# Patient Record
Sex: MALE | Race: WHITE | NOT HISPANIC OR LATINO | URBAN - METROPOLITAN AREA
[De-identification: names, ages, dates, MRNs, and addresses within clinical notes are randomized per-mention and may not be internally consistent; named-entity substitution may affect disease eponyms.]

---

## 2020-01-06 ENCOUNTER — INPATIENT (INPATIENT)
Facility: HOSPITAL | Age: 75
LOS: 4 days | Discharge: HOME | DRG: 871 | End: 2020-01-11
Attending: HOSPITALIST | Admitting: HOSPITALIST
Payer: MEDICARE

## 2020-01-06 VITALS
SYSTOLIC BLOOD PRESSURE: 120 MMHG | RESPIRATION RATE: 20 BRPM | DIASTOLIC BLOOD PRESSURE: 71 MMHG | TEMPERATURE: 102 F | HEIGHT: 66 IN | HEART RATE: 124 BPM | WEIGHT: 229.94 LBS | OXYGEN SATURATION: 95 %

## 2020-01-06 DIAGNOSIS — L03.90 CELLULITIS, UNSPECIFIED: ICD-10-CM

## 2020-01-06 DIAGNOSIS — E11.610 TYPE 2 DIABETES MELLITUS WITH DIABETIC NEUROPATHIC ARTHROPATHY: Chronic | ICD-10-CM

## 2020-01-06 LAB
ALBUMIN SERPL ELPH-MCNC: 3.8 G/DL — SIGNIFICANT CHANGE UP (ref 3.3–5.2)
ALP SERPL-CCNC: 67 U/L — SIGNIFICANT CHANGE UP (ref 40–120)
ALT FLD-CCNC: 14 U/L — SIGNIFICANT CHANGE UP
ANION GAP SERPL CALC-SCNC: 16 MMOL/L — SIGNIFICANT CHANGE UP (ref 5–17)
APPEARANCE UR: CLEAR — SIGNIFICANT CHANGE UP
APTT BLD: 29.7 SEC — SIGNIFICANT CHANGE UP (ref 27.5–36.3)
AST SERPL-CCNC: 25 U/L — SIGNIFICANT CHANGE UP
BASOPHILS # BLD AUTO: 0 K/UL — SIGNIFICANT CHANGE UP (ref 0–0.2)
BASOPHILS NFR BLD AUTO: 0 % — SIGNIFICANT CHANGE UP (ref 0–2)
BILIRUB SERPL-MCNC: 1.1 MG/DL — SIGNIFICANT CHANGE UP (ref 0.4–2)
BILIRUB UR-MCNC: NEGATIVE — SIGNIFICANT CHANGE UP
BUN SERPL-MCNC: 20 MG/DL — SIGNIFICANT CHANGE UP (ref 8–20)
CALCIUM SERPL-MCNC: 9.1 MG/DL — SIGNIFICANT CHANGE UP (ref 8.6–10.2)
CHLORIDE SERPL-SCNC: 94 MMOL/L — LOW (ref 98–107)
CO2 SERPL-SCNC: 21 MMOL/L — LOW (ref 22–29)
COLOR SPEC: YELLOW — SIGNIFICANT CHANGE UP
CREAT SERPL-MCNC: 0.81 MG/DL — SIGNIFICANT CHANGE UP (ref 0.5–1.3)
DIFF PNL FLD: NEGATIVE — SIGNIFICANT CHANGE UP
EOSINOPHIL # BLD AUTO: 0.12 K/UL — SIGNIFICANT CHANGE UP (ref 0–0.5)
EOSINOPHIL NFR BLD AUTO: 0.9 % — SIGNIFICANT CHANGE UP (ref 0–6)
EPI CELLS # UR: SIGNIFICANT CHANGE UP
GIANT PLATELETS BLD QL SMEAR: PRESENT — SIGNIFICANT CHANGE UP
GLUCOSE BLDC GLUCOMTR-MCNC: 264 MG/DL — HIGH (ref 70–99)
GLUCOSE BLDC GLUCOMTR-MCNC: 273 MG/DL — HIGH (ref 70–99)
GLUCOSE BLDC GLUCOMTR-MCNC: 276 MG/DL — HIGH (ref 70–99)
GLUCOSE SERPL-MCNC: 227 MG/DL — HIGH (ref 70–115)
GLUCOSE UR QL: 1000 MG/DL
HCT VFR BLD CALC: 43 % — SIGNIFICANT CHANGE UP (ref 39–50)
HGB BLD-MCNC: 13.9 G/DL — SIGNIFICANT CHANGE UP (ref 13–17)
INR BLD: 1.29 RATIO — HIGH (ref 0.88–1.16)
KETONES UR-MCNC: ABNORMAL
LACTATE BLDV-MCNC: 1.7 MMOL/L — SIGNIFICANT CHANGE UP (ref 0.5–2)
LACTATE BLDV-MCNC: 2.9 MMOL/L — HIGH (ref 0.5–2)
LEUKOCYTE ESTERASE UR-ACNC: NEGATIVE — SIGNIFICANT CHANGE UP
LYMPHOCYTES # BLD AUTO: 0.23 K/UL — LOW (ref 1–3.3)
LYMPHOCYTES # BLD AUTO: 1.7 % — LOW (ref 13–44)
MANUAL SMEAR VERIFICATION: SIGNIFICANT CHANGE UP
MCHC RBC-ENTMCNC: 29.6 PG — SIGNIFICANT CHANGE UP (ref 27–34)
MCHC RBC-ENTMCNC: 32.3 GM/DL — SIGNIFICANT CHANGE UP (ref 32–36)
MCV RBC AUTO: 91.7 FL — SIGNIFICANT CHANGE UP (ref 80–100)
MONOCYTES # BLD AUTO: 1.05 K/UL — HIGH (ref 0–0.9)
MONOCYTES NFR BLD AUTO: 7.8 % — SIGNIFICANT CHANGE UP (ref 2–14)
NEUTROPHILS # BLD AUTO: 12.1 K/UL — HIGH (ref 1.8–7.4)
NEUTROPHILS NFR BLD AUTO: 88.7 % — HIGH (ref 43–77)
NEUTS BAND # BLD: 0.9 % — SIGNIFICANT CHANGE UP (ref 0–8)
NITRITE UR-MCNC: NEGATIVE — SIGNIFICANT CHANGE UP
PH UR: 6 — SIGNIFICANT CHANGE UP (ref 5–8)
PLAT MORPH BLD: ABNORMAL
PLATELET # BLD AUTO: 323 K/UL — SIGNIFICANT CHANGE UP (ref 150–400)
POIKILOCYTOSIS BLD QL AUTO: SLIGHT — SIGNIFICANT CHANGE UP
POTASSIUM SERPL-MCNC: 5.1 MMOL/L — SIGNIFICANT CHANGE UP (ref 3.5–5.3)
POTASSIUM SERPL-SCNC: 5.1 MMOL/L — SIGNIFICANT CHANGE UP (ref 3.5–5.3)
PROT SERPL-MCNC: 7.8 G/DL — SIGNIFICANT CHANGE UP (ref 6.6–8.7)
PROT UR-MCNC: 15 MG/DL
PROTHROM AB SERPL-ACNC: 14.9 SEC — HIGH (ref 10–12.9)
RAPID RVP RESULT: SIGNIFICANT CHANGE UP
RBC # BLD: 4.69 M/UL — SIGNIFICANT CHANGE UP (ref 4.2–5.8)
RBC # FLD: 14.8 % — HIGH (ref 10.3–14.5)
RBC BLD AUTO: ABNORMAL
SODIUM SERPL-SCNC: 131 MMOL/L — LOW (ref 135–145)
SP GR SPEC: 1.01 — SIGNIFICANT CHANGE UP (ref 1.01–1.02)
UROBILINOGEN FLD QL: NEGATIVE MG/DL — SIGNIFICANT CHANGE UP
WBC # BLD: 13.51 K/UL — HIGH (ref 3.8–10.5)
WBC # FLD AUTO: 13.51 K/UL — HIGH (ref 3.8–10.5)
WBC UR QL: SIGNIFICANT CHANGE UP

## 2020-01-06 PROCEDURE — 93010 ELECTROCARDIOGRAM REPORT: CPT

## 2020-01-06 PROCEDURE — 99291 CRITICAL CARE FIRST HOUR: CPT

## 2020-01-06 PROCEDURE — 73630 X-RAY EXAM OF FOOT: CPT | Mod: 26,LT

## 2020-01-06 PROCEDURE — 71045 X-RAY EXAM CHEST 1 VIEW: CPT | Mod: 26

## 2020-01-06 RX ORDER — DAPAGLIFLOZIN 10 MG/1
1 TABLET, FILM COATED ORAL
Qty: 0 | Refills: 0 | DISCHARGE

## 2020-01-06 RX ORDER — SODIUM CHLORIDE 9 MG/ML
1000 INJECTION INTRAMUSCULAR; INTRAVENOUS; SUBCUTANEOUS
Refills: 0 | Status: DISCONTINUED | OUTPATIENT
Start: 2020-01-06 | End: 2020-01-07

## 2020-01-06 RX ORDER — INSULIN GLARGINE 100 [IU]/ML
50 INJECTION, SOLUTION SUBCUTANEOUS
Qty: 0 | Refills: 0 | DISCHARGE

## 2020-01-06 RX ORDER — DEXTROSE 50 % IN WATER 50 %
25 SYRINGE (ML) INTRAVENOUS ONCE
Refills: 0 | Status: DISCONTINUED | OUTPATIENT
Start: 2020-01-06 | End: 2020-01-11

## 2020-01-06 RX ORDER — INSULIN GLARGINE 100 [IU]/ML
40 INJECTION, SOLUTION SUBCUTANEOUS AT BEDTIME
Refills: 0 | Status: DISCONTINUED | OUTPATIENT
Start: 2020-01-06 | End: 2020-01-11

## 2020-01-06 RX ORDER — NOREPINEPHRINE BITARTRATE/D5W 8 MG/250ML
0.05 PLASTIC BAG, INJECTION (ML) INTRAVENOUS
Qty: 8 | Refills: 0 | Status: DISCONTINUED | OUTPATIENT
Start: 2020-01-06 | End: 2020-01-07

## 2020-01-06 RX ORDER — ENOXAPARIN SODIUM 100 MG/ML
40 INJECTION SUBCUTANEOUS EVERY 12 HOURS
Refills: 0 | Status: DISCONTINUED | OUTPATIENT
Start: 2020-01-06 | End: 2020-01-11

## 2020-01-06 RX ORDER — GLUCAGON INJECTION, SOLUTION 0.5 MG/.1ML
1 INJECTION, SOLUTION SUBCUTANEOUS ONCE
Refills: 0 | Status: DISCONTINUED | OUTPATIENT
Start: 2020-01-06 | End: 2020-01-11

## 2020-01-06 RX ORDER — VANCOMYCIN HCL 1 G
1000 VIAL (EA) INTRAVENOUS ONCE
Refills: 0 | Status: COMPLETED | OUTPATIENT
Start: 2020-01-06 | End: 2020-01-06

## 2020-01-06 RX ORDER — INSULIN GLARGINE 100 [IU]/ML
6 INJECTION, SOLUTION SUBCUTANEOUS AT BEDTIME
Refills: 0 | Status: DISCONTINUED | OUTPATIENT
Start: 2020-01-06 | End: 2020-01-06

## 2020-01-06 RX ORDER — PIPERACILLIN AND TAZOBACTAM 4; .5 G/20ML; G/20ML
3.38 INJECTION, POWDER, LYOPHILIZED, FOR SOLUTION INTRAVENOUS EVERY 8 HOURS
Refills: 0 | Status: DISCONTINUED | OUTPATIENT
Start: 2020-01-06 | End: 2020-01-10

## 2020-01-06 RX ORDER — VANCOMYCIN HCL 1 G
1000 VIAL (EA) INTRAVENOUS EVERY 12 HOURS
Refills: 0 | Status: DISCONTINUED | OUTPATIENT
Start: 2020-01-06 | End: 2020-01-07

## 2020-01-06 RX ORDER — MORPHINE SULFATE 50 MG/1
2 CAPSULE, EXTENDED RELEASE ORAL EVERY 4 HOURS
Refills: 0 | Status: DISCONTINUED | OUTPATIENT
Start: 2020-01-06 | End: 2020-01-11

## 2020-01-06 RX ORDER — INSULIN ASPART 100 [IU]/ML
15 INJECTION, SOLUTION SUBCUTANEOUS
Qty: 0 | Refills: 0 | DISCHARGE

## 2020-01-06 RX ORDER — VENLAFAXINE HCL 75 MG
0 CAPSULE, EXT RELEASE 24 HR ORAL
Qty: 0 | Refills: 0 | DISCHARGE

## 2020-01-06 RX ORDER — MIDODRINE HYDROCHLORIDE 2.5 MG/1
10 TABLET ORAL ONCE
Refills: 0 | Status: COMPLETED | OUTPATIENT
Start: 2020-01-06 | End: 2020-01-06

## 2020-01-06 RX ORDER — MORPHINE SULFATE 50 MG/1
2 CAPSULE, EXTENDED RELEASE ORAL
Refills: 0 | Status: DISCONTINUED | OUTPATIENT
Start: 2020-01-06 | End: 2020-01-11

## 2020-01-06 RX ORDER — PIPERACILLIN AND TAZOBACTAM 4; .5 G/20ML; G/20ML
3.38 INJECTION, POWDER, LYOPHILIZED, FOR SOLUTION INTRAVENOUS ONCE
Refills: 0 | Status: COMPLETED | OUTPATIENT
Start: 2020-01-06 | End: 2020-01-06

## 2020-01-06 RX ORDER — INSULIN GLARGINE 100 [IU]/ML
15 INJECTION, SOLUTION SUBCUTANEOUS
Qty: 0 | Refills: 0 | DISCHARGE

## 2020-01-06 RX ORDER — SODIUM CHLORIDE 9 MG/ML
1000 INJECTION, SOLUTION INTRAVENOUS
Refills: 0 | Status: DISCONTINUED | OUTPATIENT
Start: 2020-01-06 | End: 2020-01-11

## 2020-01-06 RX ORDER — METFORMIN HYDROCHLORIDE 850 MG/1
1 TABLET ORAL
Qty: 0 | Refills: 0 | DISCHARGE

## 2020-01-06 RX ORDER — VENLAFAXINE HCL 75 MG
1 CAPSULE, EXT RELEASE 24 HR ORAL
Qty: 0 | Refills: 0 | DISCHARGE

## 2020-01-06 RX ORDER — IBUPROFEN 200 MG
600 TABLET ORAL ONCE
Refills: 0 | Status: COMPLETED | OUTPATIENT
Start: 2020-01-06 | End: 2020-01-06

## 2020-01-06 RX ORDER — DEXTROSE 50 % IN WATER 50 %
15 SYRINGE (ML) INTRAVENOUS ONCE
Refills: 0 | Status: DISCONTINUED | OUTPATIENT
Start: 2020-01-06 | End: 2020-01-11

## 2020-01-06 RX ORDER — MIDODRINE HYDROCHLORIDE 2.5 MG/1
10 TABLET ORAL EVERY 8 HOURS
Refills: 0 | Status: DISCONTINUED | OUTPATIENT
Start: 2020-01-06 | End: 2020-01-07

## 2020-01-06 RX ORDER — METOPROLOL TARTRATE 50 MG
1 TABLET ORAL
Qty: 0 | Refills: 0 | DISCHARGE

## 2020-01-06 RX ORDER — INSULIN LISPRO 100/ML
VIAL (ML) SUBCUTANEOUS AT BEDTIME
Refills: 0 | Status: DISCONTINUED | OUTPATIENT
Start: 2020-01-06 | End: 2020-01-06

## 2020-01-06 RX ORDER — FUROSEMIDE 40 MG
1 TABLET ORAL
Qty: 0 | Refills: 0 | DISCHARGE

## 2020-01-06 RX ORDER — DAPAGLIFLOZIN 10 MG/1
0 TABLET, FILM COATED ORAL
Qty: 0 | Refills: 0 | DISCHARGE

## 2020-01-06 RX ORDER — ACETAMINOPHEN 500 MG
650 TABLET ORAL ONCE
Refills: 0 | Status: COMPLETED | OUTPATIENT
Start: 2020-01-06 | End: 2020-01-06

## 2020-01-06 RX ORDER — DAPAGLIFLOZIN 10 MG/1
10 TABLET, FILM COATED ORAL
Qty: 0 | Refills: 0 | DISCHARGE

## 2020-01-06 RX ORDER — ACETAMINOPHEN 500 MG
650 TABLET ORAL EVERY 4 HOURS
Refills: 0 | Status: DISCONTINUED | OUTPATIENT
Start: 2020-01-06 | End: 2020-01-07

## 2020-01-06 RX ORDER — SODIUM CHLORIDE 9 MG/ML
3200 INJECTION, SOLUTION INTRAVENOUS ONCE
Refills: 0 | Status: DISCONTINUED | OUTPATIENT
Start: 2020-01-06 | End: 2020-01-06

## 2020-01-06 RX ORDER — SODIUM CHLORIDE 9 MG/ML
2000 INJECTION INTRAMUSCULAR; INTRAVENOUS; SUBCUTANEOUS ONCE
Refills: 0 | Status: COMPLETED | OUTPATIENT
Start: 2020-01-06 | End: 2020-01-06

## 2020-01-06 RX ORDER — INSULIN HUMAN 100 [IU]/ML
4 INJECTION, SOLUTION SUBCUTANEOUS
Qty: 50 | Refills: 0 | Status: DISCONTINUED | OUTPATIENT
Start: 2020-01-06 | End: 2020-01-06

## 2020-01-06 RX ORDER — DEXTROSE 50 % IN WATER 50 %
12.5 SYRINGE (ML) INTRAVENOUS ONCE
Refills: 0 | Status: DISCONTINUED | OUTPATIENT
Start: 2020-01-06 | End: 2020-01-11

## 2020-01-06 RX ORDER — INSULIN HUMAN 100 [IU]/ML
10 INJECTION, SOLUTION SUBCUTANEOUS ONCE
Refills: 0 | Status: COMPLETED | OUTPATIENT
Start: 2020-01-06 | End: 2020-01-06

## 2020-01-06 RX ORDER — INSULIN LISPRO 100/ML
VIAL (ML) SUBCUTANEOUS
Refills: 0 | Status: DISCONTINUED | OUTPATIENT
Start: 2020-01-06 | End: 2020-01-06

## 2020-01-06 RX ORDER — LIRAGLUTIDE 6 MG/ML
0 INJECTION SUBCUTANEOUS
Qty: 0 | Refills: 0 | DISCHARGE

## 2020-01-06 RX ADMIN — Medication 9.78 MICROGRAM(S)/KG/MIN: at 18:50

## 2020-01-06 RX ADMIN — SODIUM CHLORIDE 1000 MILLILITER(S): 9 INJECTION INTRAMUSCULAR; INTRAVENOUS; SUBCUTANEOUS at 12:06

## 2020-01-06 RX ADMIN — SODIUM CHLORIDE 150 MILLILITER(S): 9 INJECTION INTRAMUSCULAR; INTRAVENOUS; SUBCUTANEOUS at 15:01

## 2020-01-06 RX ADMIN — MIDODRINE HYDROCHLORIDE 10 MILLIGRAM(S): 2.5 TABLET ORAL at 18:49

## 2020-01-06 RX ADMIN — PIPERACILLIN AND TAZOBACTAM 200 GRAM(S): 4; .5 INJECTION, POWDER, LYOPHILIZED, FOR SOLUTION INTRAVENOUS at 12:05

## 2020-01-06 RX ADMIN — Medication 650 MILLIGRAM(S): at 13:42

## 2020-01-06 RX ADMIN — Medication 600 MILLIGRAM(S): at 15:37

## 2020-01-06 RX ADMIN — ENOXAPARIN SODIUM 40 MILLIGRAM(S): 100 INJECTION SUBCUTANEOUS at 21:11

## 2020-01-06 RX ADMIN — Medication 2: at 21:16

## 2020-01-06 RX ADMIN — INSULIN GLARGINE 6 UNIT(S): 100 INJECTION, SOLUTION SUBCUTANEOUS at 21:16

## 2020-01-06 RX ADMIN — INSULIN GLARGINE 40 UNIT(S): 100 INJECTION, SOLUTION SUBCUTANEOUS at 23:49

## 2020-01-06 RX ADMIN — PIPERACILLIN AND TAZOBACTAM 25 GRAM(S): 4; .5 INJECTION, POWDER, LYOPHILIZED, FOR SOLUTION INTRAVENOUS at 22:26

## 2020-01-06 RX ADMIN — SODIUM CHLORIDE 150 MILLILITER(S): 9 INJECTION INTRAMUSCULAR; INTRAVENOUS; SUBCUTANEOUS at 23:36

## 2020-01-06 RX ADMIN — Medication 650 MILLIGRAM(S): at 12:05

## 2020-01-06 RX ADMIN — Medication 250 MILLIGRAM(S): at 14:48

## 2020-01-06 RX ADMIN — INSULIN HUMAN 10 UNIT(S): 100 INJECTION, SOLUTION SUBCUTANEOUS at 23:49

## 2020-01-06 RX ADMIN — Medication 250 MILLIGRAM(S): at 21:12

## 2020-01-06 NOTE — ED ADULT NURSE NOTE - NSIMPLEMENTINTERV_GEN_ALL_ED
Implemented All Fall Risk Interventions:  [place] to call system. Call bell, personal items and telephone within reach. Instruct patient to call for assistance. Room bathroom lighting operational. Non-slip footwear when patient is off stretcher. Physically safe environment: no spills, clutter or unnecessary equipment. Stretcher in lowest position, wheels locked, appropriate side rails in place. Provide visual cue, wrist band, yellow gown, etc. Monitor gait and stability. Monitor for mental status changes and reorient to person, place, and time. Review medications for side effects contributing to fall risk. Reinforce activity limits and safety measures with patient and family.

## 2020-01-06 NOTE — H&P ADULT - ASSESSMENT
1: Severe sepsis   2: Left LE SSTI with DM ulcer need to r/o OM and Bacteremia   3: FRENCH vs CKD and lactic acidosis   4: Hyperglycemia with underlying DM    Patient seen and examined   Full code now  POC explained to patient     Neuro:   Pain Mx: none for now, Gabapentin if neuropathy pain  Sedation/Anxiolytic: none  Fall precautions     Cardiovascular:   MAP Target: 65  Improved BP with IVF boluses with good mentation, peripheral perfusion with good urine output; would hold antihypertensive, maintenance IVF with trending Lactic acid; TTE; midodrine 5-10 TID     Resp/Chest:   On supplemental oxygen-> oxygenating and ventilating fine for now     Gi/Nutrition:   Diet: Diabetic diet   Bowel Regimen as needed    ID:   Microbiological studies: Blood Cx, ESR, CRP, MRI left foot with IV contrast; ID eval  Abx: Would recommend Zosyn and Vanco for now and target to organism if and when possible    Nephro/Electrolyte/Acid-Base:   Avoid nephrotoxic agents  Strict I&O with Cr monitoring   Close Electrolyte monitoring and correction as needed     Endocrinology:   ISS with coverage + PRN hypoglycemia Protocol   I would recommend long acting insulin as well as patient takes high dose Trujeo and Victoza with metformin at home       Haem/Oncology:   Mechanical and Chemical DVT ppx

## 2020-01-06 NOTE — ED PROVIDER NOTE - ATTENDING CONTRIBUTION TO CARE
seen with resident: elderly male with [facility] medical problems with fever, chills, and generalized fatigue for 2 days; also notes left foot ulcer chronic; on exam, fatigued but awake alert and appropriate; III/VI systolic murmur (knows about this); clear lungs b/l; abd soft nt nd; RLE no abnormalities other than chronic stasis dermatitis; LLE with plantar aspect of foot with large ulceration with erythema and warmth extending to medial aspect of foot;   re-evaluated after fluids. I have re-evaluated the patient's fluid status and reviewed the vital sign after the fluid bolus.  HR improved, maintaining MAP with IVF alone; seen by MICU; ok for admit to stepdown, d/w hospitalists  Podiatry consulted as well

## 2020-01-06 NOTE — H&P ADULT - NSHPSOCIALHISTORY_GEN_ALL_CORE
40 pack year tobacco abuse stopped about 40 years ago   No current alcohol tobacco or substance abuse

## 2020-01-06 NOTE — CONSULT NOTE ADULT - SUBJECTIVE AND OBJECTIVE BOX
Patient is a 74y old  Male who presents with a chief complaint of     HPI/ BRIEF HOSPITAL COURSE:   75 y/o Male who presented with c/o Lethargy feeling generalized weakness fever and chills that started today and unaware of draining left foot ulcer with erythema had fever, elevated WBC, tachycardia, hypotension needed IVF-> MICU called for eval.   At baseline, patient's BP runs in 110's and takes enalapril 10 at home.   Has been "under the weather for couple of days and denied any other complaints       PAST MEDICAL & SURGICAL HISTORY:  HLD (hyperlipidemia)  DM (diabetes mellitus)  Charcot foot due to diabetes mellitus s/p foot sx on left foot with arch defect   Hx of OM in foot  Hx of Right foot ulcer     All systems reviewed with symptoms mentions as above.     Physical Examination:    ICU Vital Signs Last 24 Hrs  T(C): 39.2 (06 Jan 2020 15:00), Max: 39.2 (06 Jan 2020 15:00)  T(F): 102.5 (06 Jan 2020 15:00), Max: 102.5 (06 Jan 2020 15:00)  HR: 92 (06 Jan 2020 15:49) (92 - 124)  BP: 95/52 (06 Jan 2020 15:49) (77/48 - 120/71)  BP(mean): --  ABP: --  ABP(mean): --  RR: 21 (06 Jan 2020 15:49) (20 - 21)  SpO2: 98% (06 Jan 2020 15:49) (95% - 98%)      General: No acute distress, Obese male in bed, diaphoretic   Neuro: AAO*3, No motor, sensory, or cranial nerve deficit (Acute)  HEENT: Pupils equal, reactive to light, Oral mucosa dry   PULM: Clear to auscultation bilaterally, no significant adventitious breath sounds   CVS: Regular rhythm and controlled to increased rate, + murmurs no rubs, or gallops  ABD: Soft, nondistended, nontender, normoactive bowel sounds, no CVA tenderness  EXT: No b/l LE edema, nontender with pedal pulse palpable (weak1+), b/l Charcot feet with draining ulcer with erythema     SKIN: Warm and well perfused, no acute rashes       Medications:    norepinephrine Infusion 0.05 MICROgram(s)/kG/Min IV Continuous <Continuous>  ibuprofen  Tablet. 600 milliGRAM(s) Oral Once  sodium chloride 0.9%. 1000 milliLiter(s) IV Continuous <Continuous>      I&O's Detail        RADIOLOGY/ Microbiology/ Labs: reviewed     CRITICAL CARE TIME SPENT: 40 min

## 2020-01-06 NOTE — H&P ADULT - HISTORY OF PRESENT ILLNESS
73 y/o Male who presented with c/o Lethargy feeling generalized weakness fever and chills that started today and unaware of draining left foot ulcer with erythema had fever, elevated WBC, tachycardia, hypotension needed IVF-> MICU called for eval.   At baseline, patient's BP runs in 110's and takes enalapril 10 at home.   Has been "under the weather for couple of days and denied any other complaints   Since ICU saw the patient, had worsening of BP and needed to be started in IV Levophed and hence admitted to MICU.

## 2020-01-06 NOTE — ED ADULT NURSE REASSESSMENT NOTE - NS ED NURSE REASSESS COMMENT FT1
pt now hypotensive, awake and alert, in no apparent distress at this time. even and unlabored resps observed, 3rd L of NS opened wide for bolus, ER MD Brock at bedside and aware. remains on 2L 02 via nasal cannula, sinus tach on monitor, vanco infusing at this time on IV pump as ordered. XRs complete.

## 2020-01-06 NOTE — CONSULT NOTE ADULT - SUBJECTIVE AND OBJECTIVE BOX
74 yr old male w DM, Charcot foot w chronic ulcer, HLD,  unspecified valvular disease,  presented,to ED w shaking and suspected high sugar    Reports recent cold and cough; taking cold ez  Traveled from [place] to attend a .  Today in [place] developed sudden chills and lethargy  Has had weekly wound care at wound center    In ED T 102.2   /71  H124  95% RA  Treated w 31 cc/kg 3200 cc RL then added 2000 cc for BP  NS  Cultured then zosyn. vanco  WBC 13K  Critical care consulted for tenuous BP    PCP Dr. Mckeon [phone]   CARD  Dr. Scales [phone]       PAST MEDICAL HX  Charcot foot  DM diabetes since   Foot ulcer  OM osteomyelitis foot    PAST SURGICAL HX  Charcot joint 'shaving'  toe amputation          T(C): 37.4 (20 @ 16:30), Max: 39.2 (20 @ 15:00)  HR: 79 (20 @ 18:45) (79 - 124)  BP: 81/45 (20 @ 18:45) (70/49 - 120/71)  RR: 21 (20 @ 17:15) (20 - 21)  SpO2: 97% (20 @ 17:15) (95% - 98%)    PHYSICAL EXAM: evaluation precludes physical exam. Pertinent physical exam findings as per video conference with  teleNA at bedside is as follow:      pt appears ill, diaphoretic  c/o back pain, lying L lateral decub      CARDIAC MARKERS ( 2020 12:10 )  x     / <0.01 ng/mL / x     / x     / x                        13.9   13.51 )-----------( 323      ( 2020 12:10 )             43.0     2020 12:10    131    |  94     |  20.0   ----------------------------<  227    5.1     |  21.0   |  0.81     Ca    9.1        2020 12:10    TPro  7.8    /  Alb  3.8    /  TBili  1.1    /  DBili  x      /  AST  25     /  ALT  14     /  AlkPhos  67     2020 12:10    PT/INR - ( 2020 12:10 )   PT: 14.9 sec;   INR: 1.29 ratio         PTT - ( 2020 12:10 )  PTT:29.7 sec  CAPILLARY BLOOD GLUCOSE      POCT Blood Glucose.: 231 mg/dL (2020 11:34)    LIVER FUNCTIONS - ( 2020 12:10 )  Alb: 3.8 g/dL / Pro: 7.8 g/dL / ALK PHOS: 67 U/L / ALT: 14 U/L / AST: 25 U/L / GGT: x           Urinalysis Basic - ( 2020 15:31 )    Color: Yellow / Appearance: Clear / S.010 / pH: x  Gluc: x / Ketone: Trace  / Bili: Negative / Urobili: Negative mg/dL   Blood: x / Protein: 15 mg/dL / Nitrite: Negative   Leuk Esterase: Negative / RBC: x / WBC 0-2   Sq Epi: x / Non Sq Epi: Occasional / Bacteria: x      RADIOLOGY  EXAM:  XR CHEST PORTABLE IMMED 1V                          PROCEDURE DATE:  2020          INTERPRETATION:  EXAM:XR CHEST IMMEDIATE.     CLINICAL INDICATION: Sepsis .     TECHNIQUE: AP view.     PRIOR EXAM: None.     FINDINGS:      Limited evaluation of left lung base. Rest of the visualized lung fields however are clear. Cardiac and mediastinal silhouette is magnified. Visualized osseous structures are unremarkable.      IMPRESSION:     Limited evaluation of left lung base, otherwise, no evidence of any acute lung disease.        EXAM:  FOOT-LEFT                          PROCEDURE DATE:  2020          INTERPRETATION:  EXAM:XR FOOT LEFT.     CLINICAL INDICATION: diabetic foot ulcer, fever, eval osteomyelitis .     TECHNIQUE: 3 views.     PRIOR EXAM: None.     FINDINGS:      Prior resection of the great toe at the level of mid first metatarsal. 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 Bony fusion across the proximal first through fifth metatarsals and tarsals. Reversal of the plantar arch.    Diffuse soft tissue swelling. Plantar foot ulcer. No radiographic evidence of adjacent osteomyelitis. Consider correlation with MR imaging.      IMPRESSION:     Plantar foot ulcer. No radiographic evidence of osteomyelitis.    Additional findings as above. 74 yr old male w DM, Charcot foot w chronic ulcer, HLD,  unspecified valvular disease,  presented,to ED w shaking and suspected high sugar    Reports recent cold and cough; taking cold ez  Traveled from [place] to attend a .  Today in [place] developed sudden chills and lethargy  Has had weekly wound care at wound center    In ED T 102.2   /71  H124  95% RA  Treated w 31 cc/kg 3200 cc RL then added 2000 cc for BP  NS  Cultured then zosyn. vanco  WBC 13K  Critical care consulted for tenuous BP    PCP Dr. Mckeon [phone]   CARD  Dr. Scales [phone]       PAST MEDICAL HX  Charcot foot  DM diabetes since   Foot ulcer  OM osteomyelitis foot    PAST SURGICAL HX  Charcot joint 'shaving'  toe amputation    FAMILY HX  +DM:father  +heart conditions: mother and 3 siblings (2 brothers, 1 sister)      SOCIAL HX  visiting from [place]  former smoker      NKDA          T(C): 37.4 (20 @ 16:30), Max: 39.2 (20 @ 15:00)  HR: 79 (20 @ 18:45) (79 - 124)  BP: 81/45 (20 @ 18:45) (70/49 - 120/71)  RR: 21 (20 @ 17:15) (20 - 21)  SpO2: 97% (20 @ 17:15) (95% - 98%)    PHYSICAL EXAM: evaluation precludes physical exam. Pertinent physical exam findings as per video conference with  teleNA at bedside is as follow:      pt appears ill, diaphoretic  c/o back pain, lying L lateral decub      CARDIAC MARKERS ( 2020 12:10 )  x     / <0.01 ng/mL / x     / x     / x                        13.9   13.51 )-----------( 323      ( 2020 12:10 )             43.0     2020 12:10    131    |  94     |  20.0   ----------------------------<  227    5.1     |  21.0   |  0.81     Ca    9.1        2020 12:10    TPro  7.8    /  Alb  3.8    /  TBili  1.1    /  DBili  x      /  AST  25     /  ALT  14     /  AlkPhos  67     2020 12:10    PT/INR - ( 2020 12:10 )   PT: 14.9 sec;   INR: 1.29 ratio         PTT - ( 2020 12:10 )  PTT:29.7 sec  CAPILLARY BLOOD GLUCOSE      POCT Blood Glucose.: 231 mg/dL (2020 11:34)    LIVER FUNCTIONS - ( 2020 12:10 )  Alb: 3.8 g/dL / Pro: 7.8 g/dL / ALK PHOS: 67 U/L / ALT: 14 U/L / AST: 25 U/L / GGT: x           Urinalysis Basic - ( 2020 15:31 )    Color: Yellow / Appearance: Clear / S.010 / pH: x  Gluc: x / Ketone: Trace  / Bili: Negative / Urobili: Negative mg/dL   Blood: x / Protein: 15 mg/dL / Nitrite: Negative   Leuk Esterase: Negative / RBC: x / WBC 0-2   Sq Epi: x / Non Sq Epi: Occasional / Bacteria: x      RADIOLOGY  EXAM:  XR CHEST PORTABLE IMMED 1V                          PROCEDURE DATE:  2020          INTERPRETATION:  EXAM:XR CHEST IMMEDIATE.     CLINICAL INDICATION: Sepsis .     TECHNIQUE: AP view.     PRIOR EXAM: None.     FINDINGS:      Limited evaluation of left lung base. Rest of the visualized lung fields however are clear. Cardiac and mediastinal silhouette is magnified. Visualized osseous structures are unremarkable.      IMPRESSION:     Limited evaluation of left lung base, otherwise, no evidence of any acute lung disease.        EXAM:  FOOT-LEFT                          PROCEDURE DATE:  2020          INTERPRETATION:  EXAM:XR FOOT LEFT.     CLINICAL INDICATION: diabetic foot ulcer, fever, eval osteomyelitis .     TECHNIQUE: 3 views.     PRIOR EXAM: None.     FINDINGS:      Prior resection of the great toe at the level of mid first metatarsal. 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 Bony fusion across the proximal first through fifth metatarsals and tarsals. Reversal of the plantar arch.    Diffuse soft tissue swelling. Plantar foot ulcer. No radiographic evidence of adjacent osteomyelitis. Consider correlation with MR imaging.      IMPRESSION:     Plantar foot ulcer. No radiographic evidence of osteomyelitis.    Additional findings as above.

## 2020-01-06 NOTE — CONSULT NOTE ADULT - ATTENDING COMMENTS
VTE enoxaparin 40 units q 12 hrs  med rec did some w pt and then contacted ED pharmacy to assist  TTE

## 2020-01-06 NOTE — ED ADULT NURSE REASSESSMENT NOTE - NS ED NURSE REASSESS COMMENT FT1
assumed care of patient at 1545. resting comfortably in stretcher in Peds 2 with family at bedside. Patient informed and aware of plan of care. A&Ox4 at this time offering no complaints. BP improving with IV fluids. will continue to monitor for need of IV pressors

## 2020-01-06 NOTE — CONSULT NOTE ADULT - ASSESSMENT
#Sepsis  likely secondary to Charcot foot ulcer:  cellulitis w lymphangitis, poss OM foot  elevated CRP  1. admit to stepdown  2. critical care note read  3. zosyn  4. vanco  5. will need MRI        #Tenuous BP  AM holding   1. enalapril  2. betablocker  3. midodrine as per CCM  4. ? pressors  5. I/O  6. daily weight      #Valvular heart disease  needs some valve replaced  1. TTE      #DM  1. diet ordered  2. hold trujeo  3. hold farxiga  4. hold metformin  5. NPH 6 units q HS  6. BGM  7. ISS        IMPROVE VTE Individual Risk Assessment    RISK                                                                Points    [  ] Previous VTE                                                  3    [  ] Thrombophilia                                               2    [  ] Lower limb paralysis                                      2        (unable to hold up >15 seconds)      [  ] Current Cancer                                              2         (within 6 months)    [X  ] Immobilization > 24 hrs                                1    [  ] ICU/CCU stay > 24 hours                              1    [ X ] Age > 60                                                      1    IMPROVE VTE Score ________2_    IMPROVE Score 0-1: Low Risk, No VTE prophylaxis required for most patients, encourage ambulation.   IMPROVE Score 2-3: At risk, pharmacologic VTE prophylaxis is indicated for most patients (in the absence of a contraindication)  IMPROVE Score > or = 4: High Risk, pharmacologic VTE prophylaxis is indicated for most patients (in the absence of a contraindication)        ADDENDUM  Pt was hypotensive when admitting hospitalist saw pt 70 sys x 2 repeat  admission refused; CCM to be reconsulted #Sepsis  likely secondary to Charcot foot ulcer:  cellulitis w lymphangitis, poss OM foot  elevated CRP  1. admit to stepdown  2. critical care note read  3. zosyn  4. vanco  5. will need MRI        #Tenuous BP   holding   1. enalapril  2. betablocker  3. midodrine as per CCM  4. ? pressors  5. I/O  6. s/p 31 cc/kg 3200 + 2000 cc on 150 cc.hr  6. daily weight      #Valvular heart disease  needs some valve replaced  1. TTE      #DM  1. diet ordered  2. hold trujeo  3. hold farxiga  4. hold metformin  5. NPH 6 units q HS  6. BGM  7. ISS        IMPROVE VTE Individual Risk Assessment    RISK                                                                Points    [  ] Previous VTE                                                  3    [  ] Thrombophilia                                               2    [  ] Lower limb paralysis                                      2        (unable to hold up >15 seconds)      [  ] Current Cancer                                              2         (within 6 months)    [X  ] Immobilization > 24 hrs                                1    [  ] ICU/CCU stay > 24 hours                              1    [ X ] Age > 60                                                      1    IMPROVE VTE Score ________2_    IMPROVE Score 0-1: Low Risk, No VTE prophylaxis required for most patients, encourage ambulation.   IMPROVE Score 2-3: At risk, pharmacologic VTE prophylaxis is indicated for most patients (in the absence of a contraindication)  IMPROVE Score > or = 4: High Risk, pharmacologic VTE prophylaxis is indicated for most patients (in the absence of a contraindication)        ADDENDUM  Pt was hypotensive when admitting hospitalist saw pt 70 sys x 2 repeat  admission refused; CCM to be reconsulted

## 2020-01-06 NOTE — ED PROVIDER NOTE - NS ED ROS FT
Constitutional: +fever, no chills  Eyes: no vision changes  ENT: +nasal congestion, no sore throat  CV: no chest pain  Resp: +cough, +shortness of breath  GI: no abdominal pain, no vomiting, no diarrhea  : no dysuria  MSK: no joint pain  Skin: no rash  Neuro: no headache, +generalized weakness, no paresthesias

## 2020-01-06 NOTE — ED PROVIDER NOTE - OBJECTIVE STATEMENT
74y M w/ hx DM, HLD, unspecified valvular disease, Charcot foot, presenting for fever and lethargy.  Per family, pt has had 2 days of cough/congestion.  They traveled to [place] from [place] yesterday for a , and this morning was at the  when he began complaining of feeling very weak.  Family notes that pt seems more lethargic than usual.  Found to be febrile on arrival in the ED.  Pt is normally ambulatory, is not on home O2.  Denies chest pain, abdominal pain, n/v/d, urinary complaints.  Of note, pt has a diabetic foot ulcer on the L; family has been changing his dressing daily.

## 2020-01-06 NOTE — H&P ADULT - NSHPPHYSICALEXAM_GEN_ALL_CORE
General: No acute distress, Obese male in bed, diaphoretic   Neuro: AAO*3, No motor, sensory, or cranial nerve deficit (Acute)  HEENT: Pupils equal, reactive to light, Oral mucosa dry   PULM: Clear to auscultation bilaterally, no significant adventitious breath sounds   CVS: Regular rhythm and controlled to increased rate, + murmurs no rubs, or gallops  ABD: Soft, nondistended, nontender, normoactive bowel sounds, no CVA tenderness  EXT: No b/l LE edema, nontender with pedal pulse palpable (weak1+), b/l Charcot feet with draining ulcer with erythema   SKIN: Warm and well perfused, no acute rashes

## 2020-01-06 NOTE — ED PROVIDER NOTE - PROGRESS NOTE DETAILS
EKG EKG reviewed - no old available for comparison.  Pt denies any chest pain.  Has no known hx CAD.  Pt's presentation more consistent with infectious etiology.  Will check troponin. Brock: brought to bedside, patient now hypotensive to 70s systolic; despite 4 liters of fluid in at this point, will start peripheral levophed; MICU reconsulted; will d/c admission to stepdown, d/w hospitalist admit to MICU

## 2020-01-06 NOTE — ED ADULT TRIAGE NOTE - CHIEF COMPLAINT QUOTE
"I think my sugar is very high" wife states pt has a diabetic ulcer on the bottom of left foot and she thinks that's what it is from.  Pt appear lethargic is responsive, agitated with wife. Breathing even & unlabored.  Sees wound care for foot.

## 2020-01-06 NOTE — ED ADULT NURSE NOTE - OBJECTIVE STATEMENT
Pt c/o hyperglycemia, fever, lethargy. Has diabetic wound that is clean and dry to bottom of left foot. Pt c/o hyperglycemia, fever, lethargy. Has diabetic wound that is clean and dry to bottom of left foot. pt reports cough congestion and fatigue as well x few days. pt with diabetic wound to left foot, plantar aspect. even and unlabored resps lethargic on arrival yet AOX3. family at bedside, no nausea/vomiting. streaking noted to lower left extremity.

## 2020-01-06 NOTE — ED PROVIDER NOTE - PHYSICAL EXAMINATION
Constitutional: Awake, alert, in no acute distress, appears lethargic but answering questions appropriately  Eyes: no scleral icterus  HENT: normocephalic, atraumatic, moist oral mucosa  CV: +tachycardic, regular rhythm, no murmur  Pulm: +tachypneic, diminished breath sounds b/l  Abdomen: soft, non-tender, non-distended  Extremities: +ulcer over plantar aspect of mid L foot, appears clean without significant drainage; +warmth and erythema over medial foot.  Skin: no jaundice  Neuro: AAOx3, moving all extremities equally

## 2020-01-06 NOTE — ED PROVIDER NOTE - CLINICAL SUMMARY MEDICAL DECISION MAKING FREE TEXT BOX
74y M w/ DM, HLD, presenting for fever, lethargy and weakness.  Pt febrile, tachycardic, tachypneic, satting 89% on RA.  Physical exam notable for L foot diabetic ulcer with some adjacent erythema noted.  Code sepsis initiated with IV fluid bolus (calculated based on ideal body weight), vancomycin and zosyn, tylenol.  CXR, labs, cultures, RVP, X-ray foot.

## 2020-01-06 NOTE — ED ADULT NURSE REASSESSMENT NOTE - NS ED NURSE REASSESS COMMENT FT1
despite 4 Liters of IV fluids patient remains hypotensive, lowest BP of systolic 70s. patient remains A&Ox4 with no complaints. to start norepinephrine at this time with ICU to accept. patient eating dinner without problem

## 2020-01-07 LAB
-  CANDIDA ALBICANS: SIGNIFICANT CHANGE UP
-  CANDIDA GLABRATA: SIGNIFICANT CHANGE UP
-  CANDIDA KRUSEI: SIGNIFICANT CHANGE UP
-  CANDIDA PARAPSILOSIS: SIGNIFICANT CHANGE UP
-  CANDIDA TROPICALIS: SIGNIFICANT CHANGE UP
-  COAGULASE NEGATIVE STAPHYLOCOCCUS: SIGNIFICANT CHANGE UP
-  K. PNEUMONIAE GROUP: SIGNIFICANT CHANGE UP
-  KPC RESISTANCE GENE: SIGNIFICANT CHANGE UP
-  STREPTOCOCCUS SP. (NOT GRP A, B OR S PNEUMONIAE): SIGNIFICANT CHANGE UP
A BAUMANNII DNA SPEC QL NAA+PROBE: SIGNIFICANT CHANGE UP
ANION GAP SERPL CALC-SCNC: 14 MMOL/L — SIGNIFICANT CHANGE UP (ref 5–17)
BASOPHILS # BLD AUTO: 0 K/UL — SIGNIFICANT CHANGE UP (ref 0–0.2)
BASOPHILS NFR BLD AUTO: 0 % — SIGNIFICANT CHANGE UP (ref 0–2)
BUN SERPL-MCNC: 23 MG/DL — HIGH (ref 8–20)
CALCIUM SERPL-MCNC: 7.9 MG/DL — LOW (ref 8.6–10.2)
CHLORIDE SERPL-SCNC: 106 MMOL/L — SIGNIFICANT CHANGE UP (ref 98–107)
CHOLEST SERPL-MCNC: 66 MG/DL — LOW (ref 110–199)
CO2 SERPL-SCNC: 21 MMOL/L — LOW (ref 22–29)
CREAT SERPL-MCNC: 0.98 MG/DL — SIGNIFICANT CHANGE UP (ref 0.5–1.3)
CULTURE RESULTS: NO GROWTH — SIGNIFICANT CHANGE UP
E CLOAC COMP DNA BLD POS QL NAA+PROBE: SIGNIFICANT CHANGE UP
E COLI DNA BLD POS QL NAA+NON-PROBE: SIGNIFICANT CHANGE UP
ENTEROCOC DNA BLD POS QL NAA+NON-PROBE: SIGNIFICANT CHANGE UP
ENTEROCOC DNA BLD POS QL NAA+NON-PROBE: SIGNIFICANT CHANGE UP
EOSINOPHIL # BLD AUTO: 0 K/UL — SIGNIFICANT CHANGE UP (ref 0–0.5)
EOSINOPHIL NFR BLD AUTO: 0 % — SIGNIFICANT CHANGE UP (ref 0–6)
ERYTHROCYTE [SEDIMENTATION RATE] IN BLOOD: 46 MM/HR — HIGH (ref 0–20)
GLUCOSE BLDC GLUCOMTR-MCNC: 124 MG/DL — HIGH (ref 70–99)
GLUCOSE BLDC GLUCOMTR-MCNC: 146 MG/DL — HIGH (ref 70–99)
GLUCOSE BLDC GLUCOMTR-MCNC: 157 MG/DL — HIGH (ref 70–99)
GLUCOSE BLDC GLUCOMTR-MCNC: 170 MG/DL — HIGH (ref 70–99)
GLUCOSE BLDC GLUCOMTR-MCNC: 180 MG/DL — HIGH (ref 70–99)
GLUCOSE BLDC GLUCOMTR-MCNC: 214 MG/DL — HIGH (ref 70–99)
GLUCOSE BLDC GLUCOMTR-MCNC: 89 MG/DL — SIGNIFICANT CHANGE UP (ref 70–99)
GLUCOSE BLDC GLUCOMTR-MCNC: 99 MG/DL — SIGNIFICANT CHANGE UP (ref 70–99)
GLUCOSE SERPL-MCNC: 146 MG/DL — HIGH (ref 70–115)
GP B STREP DNA BLD POS QL NAA+NON-PROBE: SIGNIFICANT CHANGE UP
HAEM INFLU DNA BLD POS QL NAA+NON-PROBE: SIGNIFICANT CHANGE UP
HBA1C BLD-MCNC: 7.7 % — HIGH (ref 4–5.6)
HCT VFR BLD CALC: 38.3 % — LOW (ref 39–50)
HDLC SERPL-MCNC: 25 MG/DL — LOW
HGB BLD-MCNC: 12.1 G/DL — LOW (ref 13–17)
HYPOCHROMIA BLD QL: SLIGHT — SIGNIFICANT CHANGE UP
K OXYTOCA DNA BLD POS QL NAA+NON-PROBE: SIGNIFICANT CHANGE UP
L MONOCYTOG DNA BLD POS QL NAA+NON-PROBE: SIGNIFICANT CHANGE UP
LACTATE SERPL-SCNC: 1.3 MMOL/L — SIGNIFICANT CHANGE UP (ref 0.5–2)
LIPID PNL WITH DIRECT LDL SERPL: 22 MG/DL — SIGNIFICANT CHANGE UP
LYMPHOCYTES # BLD AUTO: 0 % — LOW (ref 13–44)
LYMPHOCYTES # BLD AUTO: 0 K/UL — LOW (ref 1–3.3)
MAGNESIUM SERPL-MCNC: 1.7 MG/DL — SIGNIFICANT CHANGE UP (ref 1.6–2.6)
MANUAL SMEAR VERIFICATION: SIGNIFICANT CHANGE UP
MCHC RBC-ENTMCNC: 28.7 PG — SIGNIFICANT CHANGE UP (ref 27–34)
MCHC RBC-ENTMCNC: 31.6 GM/DL — LOW (ref 32–36)
MCV RBC AUTO: 91 FL — SIGNIFICANT CHANGE UP (ref 80–100)
METHOD TYPE: SIGNIFICANT CHANGE UP
MICROCYTES BLD QL: SLIGHT — SIGNIFICANT CHANGE UP
MONOCYTES # BLD AUTO: 0.35 K/UL — SIGNIFICANT CHANGE UP (ref 0–0.9)
MONOCYTES NFR BLD AUTO: 3.5 % — SIGNIFICANT CHANGE UP (ref 2–14)
MRSA SPEC QL CULT: SIGNIFICANT CHANGE UP
MSSA DNA SPEC QL NAA+PROBE: SIGNIFICANT CHANGE UP
N MEN ISLT CULT: SIGNIFICANT CHANGE UP
NEUTROPHILS # BLD AUTO: 9.52 K/UL — HIGH (ref 1.8–7.4)
NEUTROPHILS NFR BLD AUTO: 95.6 % — HIGH (ref 43–77)
NEUTS BAND # BLD: 0.9 % — SIGNIFICANT CHANGE UP (ref 0–8)
OVALOCYTES BLD QL SMEAR: SLIGHT — SIGNIFICANT CHANGE UP
P AERUGINOSA DNA BLD POS NAA+NON-PROBE: SIGNIFICANT CHANGE UP
PHOSPHATE SERPL-MCNC: 2.6 MG/DL — SIGNIFICANT CHANGE UP (ref 2.4–4.7)
PLAT MORPH BLD: NORMAL — SIGNIFICANT CHANGE UP
PLATELET # BLD AUTO: 283 K/UL — SIGNIFICANT CHANGE UP (ref 150–400)
PLATELET COUNT - ESTIMATE: NORMAL — SIGNIFICANT CHANGE UP
POTASSIUM SERPL-MCNC: 3.6 MMOL/L — SIGNIFICANT CHANGE UP (ref 3.5–5.3)
POTASSIUM SERPL-SCNC: 3.6 MMOL/L — SIGNIFICANT CHANGE UP (ref 3.5–5.3)
RBC # BLD: 4.21 M/UL — SIGNIFICANT CHANGE UP (ref 4.2–5.8)
RBC # FLD: 14.6 % — HIGH (ref 10.3–14.5)
RBC BLD AUTO: ABNORMAL
S MARCESCENS DNA BLD POS NAA+NON-PROBE: SIGNIFICANT CHANGE UP
S PNEUM DNA BLD POS QL NAA+NON-PROBE: SIGNIFICANT CHANGE UP
S PYO DNA BLD POS QL NAA+NON-PROBE: SIGNIFICANT CHANGE UP
SODIUM SERPL-SCNC: 141 MMOL/L — SIGNIFICANT CHANGE UP (ref 135–145)
SPECIMEN SOURCE: SIGNIFICANT CHANGE UP
TOTAL CHOLESTEROL/HDL RATIO MEASUREMENT: 3 RATIO — LOW (ref 3.4–9.6)
TRIGL SERPL-MCNC: 97 MG/DL — SIGNIFICANT CHANGE UP (ref 10–200)
WBC # BLD: 9.87 K/UL — SIGNIFICANT CHANGE UP (ref 3.8–10.5)
WBC # FLD AUTO: 9.87 K/UL — SIGNIFICANT CHANGE UP (ref 3.8–10.5)

## 2020-01-07 PROCEDURE — 93306 TTE W/DOPPLER COMPLETE: CPT | Mod: 26

## 2020-01-07 PROCEDURE — 99291 CRITICAL CARE FIRST HOUR: CPT

## 2020-01-07 PROCEDURE — 99222 1ST HOSP IP/OBS MODERATE 55: CPT

## 2020-01-07 PROCEDURE — 99233 SBSQ HOSP IP/OBS HIGH 50: CPT

## 2020-01-07 RX ORDER — MIDODRINE HYDROCHLORIDE 2.5 MG/1
5 TABLET ORAL EVERY 8 HOURS
Refills: 0 | Status: DISCONTINUED | OUTPATIENT
Start: 2020-01-07 | End: 2020-01-10

## 2020-01-07 RX ORDER — SACCHAROMYCES BOULARDII 250 MG
250 POWDER IN PACKET (EA) ORAL
Refills: 0 | Status: DISCONTINUED | OUTPATIENT
Start: 2020-01-07 | End: 2020-01-11

## 2020-01-07 RX ORDER — LEVALBUTEROL 1.25 MG/.5ML
0.63 SOLUTION, CONCENTRATE RESPIRATORY (INHALATION) EVERY 6 HOURS
Refills: 0 | Status: DISCONTINUED | OUTPATIENT
Start: 2020-01-07 | End: 2020-01-10

## 2020-01-07 RX ORDER — ATORVASTATIN CALCIUM 80 MG/1
20 TABLET, FILM COATED ORAL AT BEDTIME
Refills: 0 | Status: DISCONTINUED | OUTPATIENT
Start: 2020-01-07 | End: 2020-01-11

## 2020-01-07 RX ORDER — POTASSIUM CHLORIDE 20 MEQ
40 PACKET (EA) ORAL ONCE
Refills: 0 | Status: COMPLETED | OUTPATIENT
Start: 2020-01-07 | End: 2020-01-07

## 2020-01-07 RX ORDER — ACETAMINOPHEN 500 MG
650 TABLET ORAL EVERY 6 HOURS
Refills: 0 | Status: DISCONTINUED | OUTPATIENT
Start: 2020-01-07 | End: 2020-01-11

## 2020-01-07 RX ORDER — INSULIN GLARGINE 100 [IU]/ML
15 INJECTION, SOLUTION SUBCUTANEOUS ONCE
Refills: 0 | Status: COMPLETED | OUTPATIENT
Start: 2020-01-07 | End: 2020-01-07

## 2020-01-07 RX ORDER — VENLAFAXINE HCL 75 MG
75 CAPSULE, EXT RELEASE 24 HR ORAL DAILY
Refills: 0 | Status: DISCONTINUED | OUTPATIENT
Start: 2020-01-07 | End: 2020-01-11

## 2020-01-07 RX ORDER — MAGNESIUM SULFATE 500 MG/ML
1 VIAL (ML) INJECTION ONCE
Refills: 0 | Status: COMPLETED | OUTPATIENT
Start: 2020-01-07 | End: 2020-01-07

## 2020-01-07 RX ORDER — MAGNESIUM SULFATE 500 MG/ML
2 VIAL (ML) INJECTION ONCE
Refills: 0 | Status: COMPLETED | OUTPATIENT
Start: 2020-01-07 | End: 2020-01-07

## 2020-01-07 RX ORDER — INSULIN LISPRO 100/ML
VIAL (ML) SUBCUTANEOUS
Refills: 0 | Status: DISCONTINUED | OUTPATIENT
Start: 2020-01-07 | End: 2020-01-11

## 2020-01-07 RX ADMIN — Medication 650 MILLIGRAM(S): at 01:00

## 2020-01-07 RX ADMIN — Medication 650 MILLIGRAM(S): at 21:30

## 2020-01-07 RX ADMIN — Medication 650 MILLIGRAM(S): at 22:30

## 2020-01-07 RX ADMIN — PIPERACILLIN AND TAZOBACTAM 25 GRAM(S): 4; .5 INJECTION, POWDER, LYOPHILIZED, FOR SOLUTION INTRAVENOUS at 14:04

## 2020-01-07 RX ADMIN — ENOXAPARIN SODIUM 40 MILLIGRAM(S): 100 INJECTION SUBCUTANEOUS at 21:21

## 2020-01-07 RX ADMIN — Medication 250 MILLIGRAM(S): at 05:02

## 2020-01-07 RX ADMIN — MIDODRINE HYDROCHLORIDE 5 MILLIGRAM(S): 2.5 TABLET ORAL at 21:21

## 2020-01-07 RX ADMIN — MIDODRINE HYDROCHLORIDE 5 MILLIGRAM(S): 2.5 TABLET ORAL at 14:03

## 2020-01-07 RX ADMIN — Medication 650 MILLIGRAM(S): at 02:00

## 2020-01-07 RX ADMIN — INSULIN GLARGINE 15 UNIT(S): 100 INJECTION, SOLUTION SUBCUTANEOUS at 23:00

## 2020-01-07 RX ADMIN — Medication 40 MILLIEQUIVALENT(S): at 06:35

## 2020-01-07 RX ADMIN — ENOXAPARIN SODIUM 40 MILLIGRAM(S): 100 INJECTION SUBCUTANEOUS at 11:29

## 2020-01-07 RX ADMIN — PIPERACILLIN AND TAZOBACTAM 25 GRAM(S): 4; .5 INJECTION, POWDER, LYOPHILIZED, FOR SOLUTION INTRAVENOUS at 21:21

## 2020-01-07 RX ADMIN — Medication 75 MILLIGRAM(S): at 11:29

## 2020-01-07 RX ADMIN — Medication 100 GRAM(S): at 21:21

## 2020-01-07 RX ADMIN — PIPERACILLIN AND TAZOBACTAM 25 GRAM(S): 4; .5 INJECTION, POWDER, LYOPHILIZED, FOR SOLUTION INTRAVENOUS at 06:14

## 2020-01-07 RX ADMIN — Medication 250 MILLIGRAM(S): at 17:40

## 2020-01-07 RX ADMIN — Medication 650 MILLIGRAM(S): at 14:00

## 2020-01-07 RX ADMIN — Medication 50 GRAM(S): at 06:46

## 2020-01-07 RX ADMIN — MIDODRINE HYDROCHLORIDE 10 MILLIGRAM(S): 2.5 TABLET ORAL at 05:02

## 2020-01-07 RX ADMIN — ATORVASTATIN CALCIUM 20 MILLIGRAM(S): 80 TABLET, FILM COATED ORAL at 21:21

## 2020-01-07 NOTE — CONSULT NOTE ADULT - ASSESSMENT
73 y/o Male who presented with c/o Lethargy feeling generalized weakness fever and chills that started today and unaware of draining left foot ulcer with erythema had fever, elevated WBC, tachycardia, hypotension needed IVF-> MICU called for eval.   At baseline, patient's BP runs in 110's and takes enalapril 10 at home.   Has been "under the weather for couple of days and denied any other complaints   Since ICU saw the patient, had worsening of BP and needed to be started in IV Levophed and hence admitted to MICU. (2020 20:35)    He was up here for a  before getting sick,.. Prior coming in, he had shivers and chills at hotel.  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  Has a wound in his left foot for 2 years, being cared for by local podiatrist in [place].  Had a prior hx of long term IV ABX for foot infection in the past, going to local clinic for daily IV antibiotics - it was the least expensive route per patient.     Cultures from blood - aerobic x 2 sets with gram negative rods, identification not picked up on PCR.   wound cx sent x 2 from bedside,.   repeat Blood cx ordered 1/7/19 x 2 set by myself.     Denies antibiotics allergies.         Impression:  diabetic foot ulcer  local cellulitis  possible Osteomyelitis  gram negative bacteremia  Left charcot foot   diabetic neuropathy  febrile illness      Plan:  - check ESR  - concern for osteomyelitis of foot given that ulcer present for 2 years.   - check MRI if not otherwise contraindicated  - foot wound cx sent    regarding bacteremia with GNR  - repeat blood cx today x 2 sets  - stop vanco  - continue zosyn      DM2  - appears to be in fair control   Hemoglobin A1C, Whole Blood: 7.7 % (20 @ 06:58)  - continue DM management      - follow up all outstanding cultures  - trend temperature and WBC curve  - repeat cultures from blood and all sources if febrile.

## 2020-01-07 NOTE — CONSULT NOTE ADULT - SUBJECTIVE AND OBJECTIVE BOX
[facility]  INFECTIOUS DISEASES AND INTERNAL MEDICINE at [place]  =======================================================  Hipolito Clark MD  Diplomates American Board of Internal Medicine and Infectious Diseases  Telephone [phone]  Fax            [phone]  =======================================================    [id number]  JOAN OLIVERA   HPI:  73 y/o Male who presented with c/o Lethargy feeling generalized weakness fever and chills that started today and unaware of draining left foot ulcer with erythema had fever, elevated WBC, tachycardia, hypotension needed IVF-> MICU called for eval.   At baseline, patient's BP runs in 110's and takes enalapril 10 at home.   Has been "under the weather for couple of days and denied any other complaints   Since ICU saw the patient, had worsening of BP and needed to be started in IV Levophed and hence admitted to MICU. (2020 20:35)    He was up here for a  before getting sick,.. Prior coming in, he had shivers and chills at hotel.  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  Has a wound in his left foot for 2 years, being cared for by local podiatrist in [place].  Had a prior hx of long term IV ABX for foot infection in the past, going to local clinic for daily IV antibiotics - it was the least expensive route per patient.     Cultures from blood - aerobic x 2 sets with gram negative rods, identification not picked up on PCR.   wound cx sent x 2 from bedside,.   repeat Blood cx ordered 1/7/19 x 2 set by myself.     Denies antibiotics allergies.   He had fevers to 102.5, which has resolved.      Antibiotics course reviewed and shown in medication section    I have personally reviewed the labs and data; pertinent labs and data are listed in this note; please see below.   =======================================================  Past Medical & Surgical Hx:  =====================  PAST MEDICAL & SURGICAL HISTORY:  HLD (hyperlipidemia)  DM (diabetes mellitus)  Charcot foot due to diabetes mellitus    Problem List:  ==========  HEALTH ISSUES - PROBLEM Dx:    Social Hx:  =======  no toxic habits currently    FAMILY HISTORY:  no significant family history of immunosuppressive disorders in mother or father   =======================================================  REVIEW OF SYSTEMS:  CONSTITUTIONAL:  POSITIVE Fevers AND chills  HEENT:  No diplopia or blurred vision.  No earache, sore throat or runny nose.  CARDIOVASCULAR:  No pressure, squeezing, strangling, tightness, heaviness or aching about the chest, neck, axilla or epigastrium.  RESPIRATORY:  No cough, shortness of breath  GASTROINTESTINAL:  No nausea, vomiting or diarrhea.  GENITOURINARY:  No dysuria, frequency or urgency. No Blood in urine  MUSCULOSKELETAL:  no joint aches, no muscle pain  SKIN:  No change in skin, hair or nails.  NEUROLOGIC:  No Headaches, seizures or weakness.  PSYCHIATRIC:  No disorder of thought or mood.  ENDOCRINE:  No heat or cold intolerance  HEMATOLOGICAL:  No easy bruising or bleeding.   =======================================================  Allergies  No Known Allergies     Antibiotics:  piperacillin/tazobactam IVPB.. 3.375 Gram(s) IV Intermittent every 8 hours    Other medications:  atorvastatin 20 milliGRAM(s) Oral at bedtime  dextrose 5%. 1000 milliLiter(s) IV Continuous <Continuous>  dextrose 50% Injectable 12.5 Gram(s) IV Push once  dextrose 50% Injectable 25 Gram(s) IV Push once  dextrose 50% Injectable 25 Gram(s) IV Push once  enoxaparin Injectable 40 milliGRAM(s) SubCutaneous every 12 hours  insulin glargine Injectable (LANTUS) 40 Unit(s) SubCutaneous at bedtime  insulin lispro (HumaLOG) corrective regimen sliding scale   SubCutaneous Before meals and at bedtime  midodrine 5 milliGRAM(s) Oral every 8 hours  venlafaxine XR. 75 milliGRAM(s) Oral daily     piperacillin/tazobactam IVPB.   200 mL/Hr IV Intermittent (20 @ 12:05)   25 mL/Hr IV Intermittent (20 @ 22:26)   25 mL/Hr IV Intermittent (20 @ 06:14)    vancomycin  IVPB   250 mL/Hr IV Intermittent (20 @ 14:48)   250 mL/Hr IV Intermittent (20 @ 21:12)   250 mL/Hr IV Intermittent (20 @ 05:02)      ======================================================  Physical Exam:  ============  T(F): 97.5 (2020 08:15), Max: 102.5 (2020 15:00)  HR: 86 (2020 11:00)  BP: 114/57 (2020 11:00)  RR: 31 (2020 11:00)  SpO2: 92% (2020 11:00) (77% - 100%)  temp max in last 48H T(F): , Max: 102.5 (20 @ 15:00)Height (cm): 167.6 (20 @ 20:10)  Weight (kg): 108 (20 @ 20:10)  BMI (kg/m2): 38.4 (20 @ 20:10)  BSA (m2): 2.15 (20 @ 20:10)    General:  No acute distress.  Eye: Pupils are equal, round and reactive to light, Extraocular movements are intact, Normal conjunctiva.  HENT: Normocephalic, Oral mucosa is moist, No pharyngeal erythema, No sinus tenderness.  Neck: Supple, No lymphadenopathy.  Respiratory: Lungs are clear to auscultation, Respirations are non-labored.  Cardiovascular: Normal rate, Regular rhythm,   Gastrointestinal: Soft, Non-tender, Non-distended, Normal bowel sounds.  Genitourinary: No costovertebral angle tenderness.  Lymphatics: No lymphadenopathy neck,   Musculoskeletal: Normal range of motion, Normal strength.  Integumentary: No rash.  Large LEFT MID FOOT PLANTAR ULCER WITH TAN/ MUCOID DISCHARGE.  NOT FOUL SMELLING, BUT THICK.   SURROUNDING WARMTH AND REDNESS OF ADJACENT SKIN  Neurologic: Alert, Oriented, No focal deficits, Cranial Nerves II-XII are grossly intact.  Psychiatric: Appropriate mood & affect.    =======================================================  Labs:                        12.1   9.87  )-----------( 283      ( 2020 05:27 )             38.3       WBC Count: 9.87 K/uL (20 @ 05:27)  WBC Count: 13.51 K/uL (20 @ 12:10)          141  |  106  |  23.0<H>  ----------------------------<  146<H>  3.6   |  21.0<L>  |  0.98    Ca    7.9<L>      2020 05:27  Phos  2.6       Mg     1.7         TPro  7.8  /  Alb  3.8  /  TBili  1.1  /  DBili  x   /  AST  25  /  ALT  14  /  AlkPhos  67        Culture - Blood (20 @ 12:14) .  TYPE: (C=Critical, N=Notification, A=Abnormal) C  TESTS:  _  BLD  DATE/TIME CALLED: _ 2020 09:59:40  CALLED TO: Frannie SUÁREZ  READ BACK (2 Patient Identifiers)(Y/N): _ Y  READ BACK VALUES (Y/N): _ Y  CALLED BY: Frannie DOVER    Organism Identification: Blood Culture PCR    Method Type: PCR      Culture - Blood (20 @ 12:13)    Specimen Source: .Blood    Culture Results:   Growth in aerobic bottle: Gram Negative Rods  Aerobic Bottle: 17.58 Hours to positivity  Anaerobic Bottle: No growth to date  .  TYPE: (C=Critical, N=Notification, A=Abnormal) C  TESTS:  _ GS BLD  DATE/TIME CALLED: _ 2020 09:58:24  CALLED TO:Frannie SUÁREZ  READ BACK (2 Patient Identifiers)(Y/N): _ Y  READ BACK VALUES (Y/N): _ Y  CALLED BY: Frannie DOVER      Creatinine, Serum: 0.98 mg/dL (20 @ 05:27)  Creatinine, Serum: 0.81 mg/dL (20 @ 12:10)    C-Reactive Protein, Serum: 13.55 mg/dL (20 @ 12:10)

## 2020-01-07 NOTE — PROVIDER CONTACT NOTE (EICU) - ACTION/TREATMENT ORDERED:
As per [facility] electrolyte standard will give 40 MEQs of KCL PO as well as 2 grams of magnesium sulfate IVPB

## 2020-01-07 NOTE — PROGRESS NOTE ADULT - ASSESSMENT
74 yrs old man with h/o DM neuropathy, DM foot ulcer, Charcot foot, VHD,  a/w  BC on Jan 6: GNR  BC Jan 7: pending  Wound cx: pending      Severe sepsis/septic shock: resolved  VS stable  DG to medical floor    Diabetic foot ulcer, suspected OM  c/w IV ABx as per ID. currently on Zosyn  add florestor  f/u all pending cultures  MRI right foot with IC to r/o OM  Echo: no vegetation  wound care consult  podiatry eval    Hypotension:  h/o HTN  hold all anti-HTN  c/w midodrin  resume anti-HTN when feasible    DM: '  ISS, Accucheck  A1c, lipid    h/o Valvular HD:  echo today showed severe AS  patient is asymptomatic    DVT-P: lovenox  Diet: consistent carb 74 yrs old man with h/o DM neuropathy, DM foot ulcer, Charcot foot, VHD,  a/w  BC on Jan 6: GNR  BC Jan 7: pending  Wound cx: pending      Severe sepsis/septic shock: resolved  VS stable  DG to Tele    Diabetic foot ulcer, suspected OM  c/w IV ABx as per ID. currently on Zosyn  add florestor  f/u all pending cultures  MRI right foot with IC to r/o OM  Echo: no vegetation  wound care consult  podiatry eval  follows podiatry in [place]    Hypotension: likely due to sepsis  h/o HTN  hold all anti-HTN  c/w midodrin  resume anti-HTN when feasible    tachycardia: likely due to sepsis  sinus tachy upto 137 on tele M during movement around 119 at rest  encouraged PO hydration  small IVF    acute resp failure with hypoxia: patient is comfortable  recent travel from [place]  none acute on CXR however limited exam of left lung base  no h/o COPD. cxr did not show any emphysematous changes but some prominent bronchial markings.   former smoker 40 pack year  no ssx of PNA  maintaining sat on NC  duoneb  monitor  leg doppler to r/o DVT    DM: '  ISS, Accucheck  A1c, lipid    h/o AS  echo today showed severe AS. nomral EF  patient is asymptomatic  follows cardiology in [place]    DVT-P: lovenox  Diet: consistent carb

## 2020-01-07 NOTE — PROGRESS NOTE ADULT - SUBJECTIVE AND OBJECTIVE BOX
Patient is a 74y old  Male who presents with a chief complaint of Septic shock (2020 12:18)    75 y/o Male who presented with c/o Lethargy feeling generalized weakness fever and chills that started on the day of admission, patient was unaware of draining left foot ulcer with erythema had fever, elevated WBC, tachycardia, hypotension needed IVF. Seen by MICU and suggested not a candidate for ICU. Medical admission was called. Patient was seen by tele-H and was admitted to SDU, Started on vanco and zosyn and midorin. However his conditions continued to be worse. MICU was recalled and patient was admitted to MICU, developed hypotension needing pressor. ID was consulted. Patient tavelled from [place] to [facility] to attend . Prior to coming, he had shivering chills but no high fever. 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  Has a wound in his left foot for 2 years, being cared for by local podiatrist in [place].  Had a prior hx of long term IV ABX for foot infection in the past, going to local clinic for daily IV antibiotics - it was the least expensive route per patient. Cultures from blood - aerobic x 2 sets with gram negative rods, identification not picked up on PCR.   Wound cx sent x 2 from bedside,. Repeat Blood cx ordered 1/7/19 x 2 by ID. Seen by MICU team and suggested stable to DG to floor.    ROS:    CONSTITUTIONAL:  No distress.no fever/chills/fatigue/weight loss  HEENT:  Eyes:  No diplopia or blurred vision.   CARDIOVASCULAR:  No pressure, squeezing, tightness, heaviness or aching about the chest; no palpitations.no leg swelling, no orthopnea or PND  RESPIRATORY:  no SOB. no wheezing.no cough or sputum.  No hemoptysis  GI: no nausea, no vomiting, no diarrhea, no constipation. No hematochezia or melena  EXT:No joint pain or joint swelling or redness  SKIN: no skin break or ulcer. No cellulitis.   CNS: No headaches. No weakness.no numbness. No depression or anxiety. No SI      PAST MEDICAL HX  Charcot foot  DM diabetes since   Foot ulcer  OM osteomyelitis foot    PAST SURGICAL HX  Charcot joint 'shaving'  toe amputation    FAMILY HX  +DM:father  +heart conditions: mother and 3 siblings (2 brothers, 1 sister)      SOCIAL HX  visiting from [place]  former smoker      NKDA    MEDICATIONS  (STANDING):  atorvastatin 20 milliGRAM(s) Oral at bedtime  dextrose 5%. 1000 milliLiter(s) (50 mL/Hr) IV Continuous <Continuous>  dextrose 50% Injectable 12.5 Gram(s) IV Push once  dextrose 50% Injectable 25 Gram(s) IV Push once  dextrose 50% Injectable 25 Gram(s) IV Push once  enoxaparin Injectable 40 milliGRAM(s) SubCutaneous every 12 hours  insulin glargine Injectable (LANTUS) 40 Unit(s) SubCutaneous at bedtime  insulin lispro (HumaLOG) corrective regimen sliding scale   SubCutaneous Before meals and at bedtime  midodrine 5 milliGRAM(s) Oral every 8 hours  piperacillin/tazobactam IVPB.. 3.375 Gram(s) IV Intermittent every 8 hours  venlafaxine XR. 75 milliGRAM(s) Oral daily    MEDICATIONS  (PRN):  acetaminophen   Tablet .. 650 milliGRAM(s) Oral every 4 hours PRN Mild Pain (1 - 3)  dextrose 40% Gel 15 Gram(s) Oral once PRN Blood Glucose LESS THAN 70 milliGRAM(s)/deciliter  glucagon  Injectable 1 milliGRAM(s) IntraMuscular once PRN Glucose LESS THAN 70 milligrams/deciliter  morphine  - Injectable 2 milliGRAM(s) IV Push every 4 hours PRN Moderate Pain (4 - 6)  morphine  - Injectable 2 milliGRAM(s) IV Push every 2 hours PRN Severe Pain (7 - 10)    Vital Signs Last 24 Hrs  T(C): 37.8 (2020 16:08), Max: 37.8 (2020 16:08)  T(F): 100 (2020 16:08), Max: 100 (2020 16:08)  HR: 98 (2020 15:00) (66 - 108)  BP: 130/75 (2020 15:00) (70/49 - 162/74)  BP(mean): 94 (2020 15:00) (67 - 114)  RR: 26 (2020 15:00) (18 - 62)  SpO2: 95% (2020 15:00) (77% - 100%)          CAPILLARY BLOOD GLUCOSE      POCT Blood Glucose.: 124 mg/dL (2020 11:28)  POCT Blood Glucose.: 89 mg/dL (2020 09:23)  POCT Blood Glucose.: 170 mg/dL (2020 05:36)  POCT Blood Glucose.: 180 mg/dL (2020 02:12)  POCT Blood Glucose.: 214 mg/dL (2020 00:56)  POCT Blood Glucose.: 276 mg/dL (2020 23:48)  POCT Blood Glucose.: 264 mg/dL (2020 22:33)  POCT Blood Glucose.: 273 mg/dL (2020 21:13)      I&O's Detail    2020 07:01  -  2020 07:00  --------------------------------------------------------  IN:    norepinephrine Infusion: 54.6 mL    sodium chloride 0.9%: 1350 mL    Solution: 500 mL    Solution: 150 mL    Solution: 50 mL  Total IN: 2104.6 mL    OUT:    Voided: 1625 mL  Total OUT: 1625 mL    Total NET: 479.6 mL      2020 07:01  -  2020 16:34  --------------------------------------------------------  IN:  Total IN: 0 mL    OUT:    Voided: 900 mL  Total OUT: 900 mL    Total NET: -900 mL      GENERAL: Not in distress. Alert    HEENT:  Normocephalic and atraumatic. PEARLA,EOMI  NECK: Supple.  No JVD.    CARDIOVASCULAR: RRR S1, S2. No murmur/rubs/gallop  LUNGS: BLAE+, no rales, no wheezing, no rhonchi.    ABDOMEN: ND. Soft,  NT, no guarding / rebound / rigidity. BS normoactive. No CVA tenderness.    BACK: No spine tenderness.  EXTREMITIES: no cyanosis, no clubbing, no edema.   SKIN: no rash. No skin break or ulcer. No cellulitis.  NEUROLOGIC: AAO*3.strength is symmetric, sensation intact, speech fluent.    PSYCHIATRIC: Calm.  No agitation.    Lab:                           12.1   9.87  )-----------( 283      ( 2020 05:27 )             38.3       01-07    141  |  106  |  23.0<H>  ----------------------------<  146<H>  3.6   |  21.0<L>  |  0.98    Ca    7.9<L>      2020 05:27  Phos  2.6     -  Mg     1.7     -    TPro  7.8  /  Alb  3.8  /  TBili  1.1  /  DBili  x   /  AST  25  /  ALT  14  /  AlkPhos  67  01-06      PT/INR - ( 2020 12:10 )   PT: 14.9 sec;   INR: 1.29 ratio         PTT - ( 2020 12:10 )  PTT:29.7 sec    Urinalysis (20 @ 15:31)    pH Urine: 6.0    Glucose Qualitative, Urine: 1000 mg/dL    Blood, Urine: Negative    Color: Yellow    Urine Appearance: Clear    Bilirubin: Negative    Ketone - Urine: Trace    Specific Gravity: 1.010    Protein, Urine: 15 mg/dL    Urobilinogen: Negative mg/dL    Nitrite: Negative    Leukocyte Esterase Concentration: Negative    Culture - Blood (20 @ 12:14)    -  Multidrug (KPC pos) resistant organism: Nondet    -  Staphylococcus aureus: Nondet    -  Methicillin resistant Staphylococcus aureus (MRSA): Nondet    -  Coagulase negative Staphylococcus: Nondet    -  Enterococcus species: Nondet    -  Vancomycin resistant Enterococcus sp.: Nondet    -  Escherichia coli: Nondet    -  Klebsiella oxytoca: Nondet    -  Klebsiella pneumoniae: Nondet    -  Serratia marcescens: Nondet    -  Haemophilus influenzae: Nondet    -  Listeria monocytogenes: Nondet    -  Neisseria meningitidis: Nondet    -  Pseudomonas aeruginosa: Nondet    -  Acinetobacter baumanii: Nondet    -  Enterobacter cloacae complex: Nondet    -  Streptococcus sp. (Not Grp A, B or S pneumoniae): Nondet    -  Streptococcus agalactiae (Group B): Nondet    -  Streptococcus pyogenes (Group A): Nondet    -  Streptococcus pneumoniae: Nondet    -  Candida albicans: Nondet    -  Candida glabrata: Nondet    -  Candida krusei: Nondet    -  Candida parapsilosis: Nondet    -  Candida tropicalis: Nondet    Specimen Source: .Blood    Organism: Blood Culture PCR    Culture Results:   Growth in aerobic bottle: Gram Negative Rods  Aerobic Bottle: 18.08 Hours to positivity  Anaerobic Bottle: No growth to date  .  ***Blood Panel PCR results on this specimen are available  approximately 3 hours after the Gram stain result.***  Gram stain, PCR, and/or culture results may not always  correspond due to difference in methodologies.  ************************************************************  This PCR assay was performed using [employer].  The following targets are tested for: Enterococcus,  vancomycin resistant enterococci, Listeria monocytogenes,  coagulase negative staphylococci, S. aureus,  methicillin resistant S. aureus, Streptococcus agalactiae  (Group B), S. pneumoniae, S. pyogenes (Group A),  Acinetobacter baumannii, Enterobacter cloacae, E. coli,  Klebsiella oxytoca, K. pneumoniae, Proteus sp.,  Serratia marcescens, Haemophilus influenzae,  Neisseria meningitidis, Pseudomonas aeruginosa, Candida  albicans, C. glabrata, C krusei, C parapsilosis,  C. tropicalis and the KPC resistance gene.  "Due to technical problems, Proteus sp. will Not be reported as part of  the BCID panel until further notice"  .  TYPE: (C=Critical, N=Notification, A=Abnormal) C  TESTS:  _ [facility]  DATE/TIME CALLED: _ 2020 09:59:40  CALLED TO: Frannie SUÁREZ  READ BACK (2 Patient Identifiers)(Y/N): _ Y  READ BACK VALUES (Y/N): _ Y  CALLED BY: _ NLUPO    Organism Identification: Blood Culture PCR    Method Type: PCR        < from: Xray Chest 1 View-PORTABLE IMMEDIATE (20 @ 15:07) >  IMPRESSION:     Limited evaluation of left lung base, otherwise, noevidence of any acute lung disease.    < end of copied text > Patient is a 74y old  Male who presents with a chief complaint of Septic shock (2020 12:18)    75 y/o Male who presented with c/o Lethargy feeling generalized weakness fever and chills that started on the day of admission, patient was unaware of draining left foot ulcer with erythema had fever, elevated WBC, tachycardia, hypotension needed IVF. Seen by MICU and suggested not a candidate for ICU. Medical admission was called. Patient was seen by tele-H and was admitted to SDU, Started on vanco and zosyn and midorin. However his conditions continued to be worse. MICU was recalled and patient was admitted to MICU, developed hypotension needing pressor. ID was consulted. Patient tavelled from [place] to [facility] to attend . Prior to coming, he had shivering chills but no high fever. 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  Has a wound in his left foot for 2 years, being cared for by local podiatrist in [place].  Had a prior hx of long term IV ABX for foot infection in the past, going to local clinic for daily IV antibiotics - it was the least expensive route per patient. Cultures from blood - aerobic x 2 sets with gram negative rods, identification not picked up on PCR.   Wound cx sent x 2 from bedside,. Repeat Blood cx ordered 1/7/19 x 2 by ID. Seen by MICU team and suggested stable to DG to floor.    Interval: seen and examined at bedside. currently denied complaints.     ROS:    CONSTITUTIONAL:  No distress. +fever/chills/fatigue  HEENT:  Eyes:  No diplopia or blurred vision.   CARDIOVASCULAR:  No pressure, squeezing, tightness, heaviness or aching about the chest; no palpitations. chronic leg swelling, no orthopnea or PND  RESPIRATORY:  no SOB. no wheezing. no cough or sputum.  No hemoptysis  GI: no abd pain, no nausea, no vomiting, no diarrhea, no constipation. No hematochezia or melena  EXT:as per HPI  SKIN: no skin break or ulcer. No rash.. chronic redness b/l Legs  CNS: No headaches. No weakness.no numbness. No depression or anxiety. No SI      PAST MEDICAL HX  Charcot foot  DM diabetes since   Foot ulcer  OM osteomyelitis foot    PAST SURGICAL HX  Charcot joint 'shaving'  toe amputation    FAMILY HX  +DM:father  +heart conditions: mother and 3 siblings (2 brothers, 1 sister)      SOCIAL HX  visiting from [place]  former smoker      NKDA    MEDICATIONS  (STANDING):  atorvastatin 20 milliGRAM(s) Oral at bedtime  dextrose 5%. 1000 milliLiter(s) (50 mL/Hr) IV Continuous <Continuous>  dextrose 50% Injectable 12.5 Gram(s) IV Push once  dextrose 50% Injectable 25 Gram(s) IV Push once  dextrose 50% Injectable 25 Gram(s) IV Push once  enoxaparin Injectable 40 milliGRAM(s) SubCutaneous every 12 hours  insulin glargine Injectable (LANTUS) 40 Unit(s) SubCutaneous at bedtime  insulin lispro (HumaLOG) corrective regimen sliding scale   SubCutaneous Before meals and at bedtime  midodrine 5 milliGRAM(s) Oral every 8 hours  piperacillin/tazobactam IVPB.. 3.375 Gram(s) IV Intermittent every 8 hours  venlafaxine XR. 75 milliGRAM(s) Oral daily    MEDICATIONS  (PRN):  acetaminophen   Tablet .. 650 milliGRAM(s) Oral every 4 hours PRN Mild Pain (1 - 3)  dextrose 40% Gel 15 Gram(s) Oral once PRN Blood Glucose LESS THAN 70 milliGRAM(s)/deciliter  glucagon  Injectable 1 milliGRAM(s) IntraMuscular once PRN Glucose LESS THAN 70 milligrams/deciliter  morphine  - Injectable 2 milliGRAM(s) IV Push every 4 hours PRN Moderate Pain (4 - 6)  morphine  - Injectable 2 milliGRAM(s) IV Push every 2 hours PRN Severe Pain (7 - 10)    Vital Signs Last 24 Hrs  T(C): 37.8 (2020 16:08), Max: 37.8 (2020 16:08)  T(F): 100 (2020 16:08), Max: 100 (2020 16:08)  HR: 98 (2020 15:00) (66 - 108)  BP: 130/75 (2020 15:00) (70/49 - 162/74)  BP(mean): 94 (2020 15:00) (67 - 114)  RR: 26 (2020 15:00) (18 - 62)  SpO2: 95% (2020 15:00) (77% - 100%)          CAPILLARY BLOOD GLUCOSE      POCT Blood Glucose.: 124 mg/dL (2020 11:28)  POCT Blood Glucose.: 89 mg/dL (2020 09:23)  POCT Blood Glucose.: 170 mg/dL (2020 05:36)  POCT Blood Glucose.: 180 mg/dL (2020 02:12)  POCT Blood Glucose.: 214 mg/dL (2020 00:56)  POCT Blood Glucose.: 276 mg/dL (2020 23:48)  POCT Blood Glucose.: 264 mg/dL (2020 22:33)  POCT Blood Glucose.: 273 mg/dL (2020 21:13)      I&O's Detail    2020 07:01  -  2020 07:00  --------------------------------------------------------  IN:    norepinephrine Infusion: 54.6 mL    sodium chloride 0.9%: 1350 mL    Solution: 500 mL    Solution: 150 mL    Solution: 50 mL  Total IN: 2104.6 mL    OUT:    Voided: 1625 mL  Total OUT: 1625 mL    Total NET: 479.6 mL      2020 07:01  -  2020 16:34  --------------------------------------------------------  IN:  Total IN: 0 mL    OUT:    Voided: 900 mL  Total OUT: 900 mL    Total NET: -900 mL      GENERAL: Not in distress. Alert . maintaining sat on NC  HEENT:  Normocephalic and atraumatic. PEARLA,EOMI  NECK: Supple.  No JVD.    CARDIOVASCULAR: RRR S1, S2. No murmur/rubs/gallop  LUNGS: BLAE reduced, no rales, no wheezing, no rhonchi.    ABDOMEN: ND. Soft,  NT, no guarding / rebound / rigidity. BS normoactive. No CVA tenderness.    EXTREMITIES: no cyanosis, no clubbing, + edema. no leg or calf TP  SKIN: no rash. left foot in dressing. redness over b/l legs mostly right leg which patients described as chronic  NEUROLOGIC: AAO*3.strength is symmetric, sensation intact, speech fluent.    PSYCHIATRIC: Calm.  No agitation.    Lab:                           12.1   9.87  )-----------( 283      ( 2020 05:27 )             38.3       01-07    141  |  106  |  23.0<H>  ----------------------------<  146<H>  3.6   |  21.0<L>  |  0.98    Ca    7.9<L>      2020 05:27  Phos  2.6     -  Mg     1.7     -    TPro  7.8  /  Alb  3.8  /  TBili  1.1  /  DBili  x   /  AST  25  /  ALT  14  /  AlkPhos  67  01-06      PT/INR - ( 2020 12:10 )   PT: 14.9 sec;   INR: 1.29 ratio         PTT - ( 2020 12:10 )  PTT:29.7 sec    Urinalysis (20 @ 15:31)    pH Urine: 6.0    Glucose Qualitative, Urine: 1000 mg/dL    Blood, Urine: Negative    Color: Yellow    Urine Appearance: Clear    Bilirubin: Negative    Ketone - Urine: Trace    Specific Gravity: 1.010    Protein, Urine: 15 mg/dL    Urobilinogen: Negative mg/dL    Nitrite: Negative    Leukocyte Esterase Concentration: Negative    Culture - Blood (20 @ 12:14)    -  Multidrug (KPC pos) resistant organism: Nondet    -  Staphylococcus aureus: Nondet    -  Methicillin resistant Staphylococcus aureus (MRSA): Nondet    -  Coagulase negative Staphylococcus: Nondet    -  Enterococcus species: Nondet    -  Vancomycin resistant Enterococcus sp.: Nondet    -  Escherichia coli: Nondet    -  Klebsiella oxytoca: Nondet    -  Klebsiella pneumoniae: Nondet    -  Serratia marcescens: Nondet    -  Haemophilus influenzae: Nondet    -  Listeria monocytogenes: Nondet    -  Neisseria meningitidis: Nondet    -  Pseudomonas aeruginosa: Nondet    -  Acinetobacter baumanii: Nondet    -  Enterobacter cloacae complex: Nondet    -  Streptococcus sp. (Not Grp A, B or S pneumoniae): Nondet    -  Streptococcus agalactiae (Group B): Nondet    -  Streptococcus pyogenes (Group A): Nondet    -  Streptococcus pneumoniae: Nondet    -  Candida albicans: Nondet    -  Candida glabrata: Nondet    -  Candida krusei: Nondet    -  Candida parapsilosis: Nondet    -  Candida tropicalis: Nondet    Specimen Source: .Blood    Organism: Blood Culture PCR    Culture Results:   Growth in aerobic bottle: Gram Negative Rods  Aerobic Bottle: 18.08 Hours to positivity  Anaerobic Bottle: No growth to date  .  ***Blood Panel PCR results on this specimen are available  approximately 3 hours after the Gram stain result.***  Gram stain, PCR, and/or culture results may not always  correspond due to difference in methodologies.  ************************************************************  This PCR assay was performed using [employer].  The following targets are tested for: Enterococcus,  vancomycin resistant enterococci, Listeria monocytogenes,  coagulase negative staphylococci, S. aureus,  methicillin resistant S. aureus, Streptococcus agalactiae  (Group B), S. pneumoniae, S. pyogenes (Group A),  Acinetobacter baumannii, Enterobacter cloacae, E. coli,  Klebsiella oxytoca, K. pneumoniae, Proteus sp.,  Serratia marcescens, Haemophilus influenzae,  Neisseria meningitidis, Pseudomonas aeruginosa, Candida  albicans, C. glabrata, C krusei, C parapsilosis,  C. tropicalis and the KPC resistance gene.  "Due to technical problems, Proteus sp. will Not be reported as part of  the BCID panel until further notice"  .  TYPE: (C=Critical, N=Notification, A=Abnormal) C  TESTS:  _ [facility]  DATE/TIME CALLED: _ 2020 09:59:40  CALLED TO: _ ARLENE SUÁREZ  READ BACK (2 Patient Identifiers)(Y/N): _ Y  READ BACK VALUES (Y/N): _ Y  CALLED BY: _ STANISLAW    Organism Identification: Blood Culture PCR    Method Type: PCR        < from: Xray Chest 1 View-PORTABLE IMMEDIATE (20 @ 15:07) >  IMPRESSION:     Limited evaluation of left lung base, otherwise, no evidence of any acute lung disease.    < end of copied text >

## 2020-01-07 NOTE — PROGRESS NOTE ADULT - SUBJECTIVE AND OBJECTIVE BOX
Patient is a 74y old  Male who presents with a chief complaint of Septic shock (2020 20:35)    PAST MEDICAL & SURGICAL HISTORY:  HLD (hyperlipidemia)  DM (diabetes mellitus)  Charcot foot due to diabetes mellitus    JOAN OLIVERA   74y    Male        Review of Systems:                       All other ROS are negative.    Allergies    No Known Allergies    Intolerances      Weight (kg): 108 (20 @ 20:10)  BMI (kg/m2): 38.4 (20 @ 20:10)    ICU Vital Signs Last 24 Hrs  T(C): 36.7 (2020 00:45), Max: 39.2 (2020 15:00)  T(F): 98.1 (2020 00:45), Max: 102.5 (2020 15:00)  HR: 87 (2020 02:30) (66 - 124)  BP: 103/56 (2020 02:30) (70/49 - 162/74)  BP(mean): 75 (2020 02:30) (70 - 114)  ABP: --  ABP(mean): --  RR: 29 (2020 02:30) (18 - 62)  SpO2: 91% (2020 02:30) (77% - 100%)    Physical Examination:    General: NAD    HEENT: no JVD    PULM: bilateral BS    CVS: s1 s2 reg     ABD: soft     EXT: trace edema     SKIN:  L plantar foot wound dressed will look later.  Seen by Dr Palmer ID/cc     Neuro: awake alert           LABS:                        13.9   13.51 )-----------( 323      ( 2020 12:10 )             43.0     01-06    131<L>  |  94<L>  |  20.0  ----------------------------<  227<H>  5.1   |  21.0<L>  |  0.81    Ca    9.1      2020 12:10    TPro  7.8  /  Alb  3.8  /  TBili  1.1  /  DBili  x   /  AST  25  /  ALT  14  /  AlkPhos  67  01-06      CARDIAC MARKERS ( 2020 12:10 )  x     / <0.01 ng/mL / x     / x     / x          CAPILLARY BLOOD GLUCOSE      POCT Blood Glucose.: 180 mg/dL (2020 02:12)  POCT Blood Glucose.: 214 mg/dL (2020 00:56)  POCT Blood Glucose.: 276 mg/dL (2020 23:48)  POCT Blood Glucose.: 264 mg/dL (2020 22:33)  POCT Blood Glucose.: 273 mg/dL (2020 21:13)  POCT Blood Glucose.: 231 mg/dL (2020 11:34)    PT/INR - ( 2020 12:10 )   PT: 14.9 sec;   INR: 1.29 ratio         PTT - ( 2020 12:10 )  PTT:29.7 sec  Urinalysis Basic - ( 2020 15:31 )    Color: Yellow / Appearance: Clear / S.010 / pH: x  Gluc: x / Ketone: Trace  / Bili: Negative / Urobili: Negative mg/dL   Blood: x / Protein: 15 mg/dL / Nitrite: Negative   Leuk Esterase: Negative / RBC: x / WBC 0-2   Sq Epi: x / Non Sq Epi: Occasional / Bacteria: x      CULTURES:  Rapid RVP Result: NotDetec ( @ 12:16)      Medications:  MEDICATIONS  (STANDING):  dextrose 5%. 1000 milliLiter(s) (50 mL/Hr) IV Continuous <Continuous>  dextrose 50% Injectable 12.5 Gram(s) IV Push once  dextrose 50% Injectable 25 Gram(s) IV Push once  dextrose 50% Injectable 25 Gram(s) IV Push once  enoxaparin Injectable 40 milliGRAM(s) SubCutaneous every 12 hours  insulin glargine Injectable (LANTUS) 40 Unit(s) SubCutaneous at bedtime  midodrine 10 milliGRAM(s) Oral every 8 hours  norepinephrine Infusion 0.05 MICROgram(s)/kG/Min (9.778 mL/Hr) IV Continuous <Continuous>  piperacillin/tazobactam IVPB.. 3.375 Gram(s) IV Intermittent every 8 hours  sodium chloride 0.9%. 1000 milliLiter(s) (150 mL/Hr) IV Continuous <Continuous>  vancomycin  IVPB 1000 milliGRAM(s) IV Intermittent every 12 hours    MEDICATIONS  (PRN):  acetaminophen   Tablet .. 650 milliGRAM(s) Oral every 4 hours PRN Mild Pain (1 - 3)  dextrose 40% Gel 15 Gram(s) Oral once PRN Blood Glucose LESS THAN 70 milliGRAM(s)/deciliter  glucagon  Injectable 1 milliGRAM(s) IntraMuscular once PRN Glucose LESS THAN 70 milligrams/deciliter  morphine  - Injectable 2 milliGRAM(s) IV Push every 4 hours PRN Moderate Pain (4 - 6)  morphine  - Injectable 2 milliGRAM(s) IV Push every 2 hours PRN Severe Pain (7 - 10)         @ 07:01  -   @ 02:48  --------------------------------------------------------  IN: 1004.6 mL / OUT: 1075 mL / NET: -70.4 mL        RADIOLOGY/IMAGING/ECHO  < from: Xray Foot AP + Lateral + Oblique, Left (20 @ 15:06) >  IMPRESSION:     Plantar foot ulcer. No radiographic evidence of osteomyelitis.    Additional findings as above.                Assessment/Plan:    74M hx HTN HLD Charcot joint admit  with fatigue rigors septic shock with lactic acidosis due to and infected L foot wound.    Got a clean bill of health from his wound care center in [place] last Thursday.        Midodrine added.   Pressors tapered off  Lactate has cleared.      ABX  vanco zosyn    ID consult called   Cultures sent.  Including L foot wound   ESR   MRI L foot  Wound care consult          CRITICAL CARE TIME SPENT: 32 minutes assessing presenting problems of acute illness, which pose high probability of life threatening deterioration or end organ damage/dysfunction, as well as medical decision making including initiating plan of care, reviewing data, reviewing radiologic exams, discussing with multidisciplinary team,  discussing goals of care with patient/family, and writing this note.  Non-inclusive of procedures performed,

## 2020-01-08 LAB
-  AMIKACIN: SIGNIFICANT CHANGE UP
-  AMIKACIN: SIGNIFICANT CHANGE UP
-  AMPICILLIN/SULBACTAM: SIGNIFICANT CHANGE UP
-  AMPICILLIN/SULBACTAM: SIGNIFICANT CHANGE UP
-  AMPICILLIN: SIGNIFICANT CHANGE UP
-  AMPICILLIN: SIGNIFICANT CHANGE UP
-  AZTREONAM: SIGNIFICANT CHANGE UP
-  AZTREONAM: SIGNIFICANT CHANGE UP
-  CEFAZOLIN: SIGNIFICANT CHANGE UP
-  CEFAZOLIN: SIGNIFICANT CHANGE UP
-  CEFEPIME: SIGNIFICANT CHANGE UP
-  CEFEPIME: SIGNIFICANT CHANGE UP
-  CEFOXITIN: SIGNIFICANT CHANGE UP
-  CEFOXITIN: SIGNIFICANT CHANGE UP
-  CEFTRIAXONE: SIGNIFICANT CHANGE UP
-  CEFTRIAXONE: SIGNIFICANT CHANGE UP
-  CIPROFLOXACIN: SIGNIFICANT CHANGE UP
-  CIPROFLOXACIN: SIGNIFICANT CHANGE UP
-  ERTAPENEM: SIGNIFICANT CHANGE UP
-  ERTAPENEM: SIGNIFICANT CHANGE UP
-  GENTAMICIN: SIGNIFICANT CHANGE UP
-  GENTAMICIN: SIGNIFICANT CHANGE UP
-  LEVOFLOXACIN: SIGNIFICANT CHANGE UP
-  LEVOFLOXACIN: SIGNIFICANT CHANGE UP
-  MEROPENEM: SIGNIFICANT CHANGE UP
-  MEROPENEM: SIGNIFICANT CHANGE UP
-  PIPERACILLIN/TAZOBACTAM: SIGNIFICANT CHANGE UP
-  PIPERACILLIN/TAZOBACTAM: SIGNIFICANT CHANGE UP
-  TOBRAMYCIN: SIGNIFICANT CHANGE UP
-  TOBRAMYCIN: SIGNIFICANT CHANGE UP
-  TRIMETHOPRIM/SULFAMETHOXAZOLE: SIGNIFICANT CHANGE UP
-  TRIMETHOPRIM/SULFAMETHOXAZOLE: SIGNIFICANT CHANGE UP
ANION GAP SERPL CALC-SCNC: 13 MMOL/L — SIGNIFICANT CHANGE UP (ref 5–17)
BUN SERPL-MCNC: 14 MG/DL — SIGNIFICANT CHANGE UP (ref 8–20)
CALCIUM SERPL-MCNC: 8.1 MG/DL — LOW (ref 8.6–10.2)
CHLORIDE SERPL-SCNC: 99 MMOL/L — SIGNIFICANT CHANGE UP (ref 98–107)
CO2 SERPL-SCNC: 20 MMOL/L — LOW (ref 22–29)
CREAT SERPL-MCNC: 0.84 MG/DL — SIGNIFICANT CHANGE UP (ref 0.5–1.3)
ERYTHROCYTE [SEDIMENTATION RATE] IN BLOOD: 42 MM/HR — HIGH (ref 0–20)
GLUCOSE BLDC GLUCOMTR-MCNC: 121 MG/DL — HIGH (ref 70–99)
GLUCOSE BLDC GLUCOMTR-MCNC: 236 MG/DL — HIGH (ref 70–99)
GLUCOSE BLDC GLUCOMTR-MCNC: 278 MG/DL — HIGH (ref 70–99)
GLUCOSE BLDC GLUCOMTR-MCNC: 94 MG/DL — SIGNIFICANT CHANGE UP (ref 70–99)
GLUCOSE SERPL-MCNC: 102 MG/DL — SIGNIFICANT CHANGE UP (ref 70–115)
HCT VFR BLD CALC: 32.7 % — LOW (ref 39–50)
HGB BLD-MCNC: 11.2 G/DL — LOW (ref 13–17)
MAGNESIUM SERPL-MCNC: 2.2 MG/DL — SIGNIFICANT CHANGE UP (ref 1.6–2.6)
MCHC RBC-ENTMCNC: 31.5 PG — SIGNIFICANT CHANGE UP (ref 27–34)
MCHC RBC-ENTMCNC: 34.3 GM/DL — SIGNIFICANT CHANGE UP (ref 32–36)
MCV RBC AUTO: 92.1 FL — SIGNIFICANT CHANGE UP (ref 80–100)
METHOD TYPE: SIGNIFICANT CHANGE UP
METHOD TYPE: SIGNIFICANT CHANGE UP
PLATELET # BLD AUTO: 260 K/UL — SIGNIFICANT CHANGE UP (ref 150–400)
POTASSIUM SERPL-MCNC: 4.4 MMOL/L — SIGNIFICANT CHANGE UP (ref 3.5–5.3)
POTASSIUM SERPL-SCNC: 4.4 MMOL/L — SIGNIFICANT CHANGE UP (ref 3.5–5.3)
RBC # BLD: 3.55 M/UL — LOW (ref 4.2–5.8)
RBC # FLD: 14.7 % — HIGH (ref 10.3–14.5)
SODIUM SERPL-SCNC: 132 MMOL/L — LOW (ref 135–145)
WBC # BLD: 8.12 K/UL — SIGNIFICANT CHANGE UP (ref 3.8–10.5)
WBC # FLD AUTO: 8.12 K/UL — SIGNIFICANT CHANGE UP (ref 3.8–10.5)

## 2020-01-08 PROCEDURE — 99232 SBSQ HOSP IP/OBS MODERATE 35: CPT

## 2020-01-08 RX ORDER — IBUPROFEN 200 MG
400 TABLET ORAL ONCE
Refills: 0 | Status: COMPLETED | OUTPATIENT
Start: 2020-01-08 | End: 2020-01-08

## 2020-01-08 RX ADMIN — Medication 650 MILLIGRAM(S): at 18:07

## 2020-01-08 RX ADMIN — Medication 6: at 21:54

## 2020-01-08 RX ADMIN — LEVALBUTEROL 0.63 MILLIGRAM(S): 1.25 SOLUTION, CONCENTRATE RESPIRATORY (INHALATION) at 20:57

## 2020-01-08 RX ADMIN — PIPERACILLIN AND TAZOBACTAM 25 GRAM(S): 4; .5 INJECTION, POWDER, LYOPHILIZED, FOR SOLUTION INTRAVENOUS at 14:07

## 2020-01-08 RX ADMIN — Medication 250 MILLIGRAM(S): at 17:17

## 2020-01-08 RX ADMIN — MIDODRINE HYDROCHLORIDE 5 MILLIGRAM(S): 2.5 TABLET ORAL at 21:54

## 2020-01-08 RX ADMIN — MIDODRINE HYDROCHLORIDE 5 MILLIGRAM(S): 2.5 TABLET ORAL at 05:35

## 2020-01-08 RX ADMIN — INSULIN GLARGINE 40 UNIT(S): 100 INJECTION, SOLUTION SUBCUTANEOUS at 21:56

## 2020-01-08 RX ADMIN — PIPERACILLIN AND TAZOBACTAM 25 GRAM(S): 4; .5 INJECTION, POWDER, LYOPHILIZED, FOR SOLUTION INTRAVENOUS at 21:54

## 2020-01-08 RX ADMIN — Medication 400 MILLIGRAM(S): at 20:07

## 2020-01-08 RX ADMIN — LEVALBUTEROL 0.63 MILLIGRAM(S): 1.25 SOLUTION, CONCENTRATE RESPIRATORY (INHALATION) at 13:39

## 2020-01-08 RX ADMIN — PIPERACILLIN AND TAZOBACTAM 25 GRAM(S): 4; .5 INJECTION, POWDER, LYOPHILIZED, FOR SOLUTION INTRAVENOUS at 05:35

## 2020-01-08 RX ADMIN — LEVALBUTEROL 0.63 MILLIGRAM(S): 1.25 SOLUTION, CONCENTRATE RESPIRATORY (INHALATION) at 07:45

## 2020-01-08 RX ADMIN — ENOXAPARIN SODIUM 40 MILLIGRAM(S): 100 INJECTION SUBCUTANEOUS at 10:55

## 2020-01-08 RX ADMIN — Medication 75 MILLIGRAM(S): at 11:02

## 2020-01-08 RX ADMIN — MIDODRINE HYDROCHLORIDE 5 MILLIGRAM(S): 2.5 TABLET ORAL at 14:07

## 2020-01-08 RX ADMIN — LEVALBUTEROL 0.63 MILLIGRAM(S): 1.25 SOLUTION, CONCENTRATE RESPIRATORY (INHALATION) at 03:53

## 2020-01-08 RX ADMIN — Medication 650 MILLIGRAM(S): at 17:17

## 2020-01-08 RX ADMIN — Medication 250 MILLIGRAM(S): at 05:35

## 2020-01-08 RX ADMIN — ENOXAPARIN SODIUM 40 MILLIGRAM(S): 100 INJECTION SUBCUTANEOUS at 21:54

## 2020-01-08 RX ADMIN — Medication 400 MILLIGRAM(S): at 20:45

## 2020-01-08 RX ADMIN — Medication 650 MILLIGRAM(S): at 07:54

## 2020-01-08 RX ADMIN — ATORVASTATIN CALCIUM 20 MILLIGRAM(S): 80 TABLET, FILM COATED ORAL at 21:54

## 2020-01-08 RX ADMIN — Medication 4: at 17:17

## 2020-01-08 RX ADMIN — Medication 650 MILLIGRAM(S): at 08:54

## 2020-01-08 NOTE — CONSULT NOTE ADULT - SUBJECTIVE AND OBJECTIVE BOX
S : 74y year old Male seen at bedside for Left plantar foot ulceration. Pt states he is visiting from [place] where he sees a podiatrist who has been applying alginate to the foot. Pt states he was here over the weekend, when he developed fevers and chills on Monday AM 1/6. Pt came to the hospital then.     Chief Complaint : Patient is a 74y old  Male who presents with a chief complaint of Septic shock (08 Jan 2020 09:18)    HPI : HPI:  73 y/o Male who presented with c/o Lethargy feeling generalized weakness fever and chills that started today and unaware of draining left foot ulcer with erythema had fever, elevated WBC, tachycardia, hypotension needed IVF-> MICU called for eval.   At baseline, patient's BP runs in 110's and takes enalapril 10 at home.   Has been "under the weather for couple of days and denied any other complaints   Since ICU saw the patient, had worsening of BP and needed to be started in IV Levophed and hence admitted to MICU. (06 Jan 2020 20:35)      Patient admits to  (+) Fevers, (+) Chills, (-) Nausea, (-) Vomiting, (-) Shortness of Breath      PMH: HLD (hyperlipidemia)  DM (diabetes mellitus)    PSH:Charcot foot due to diabetes mellitus      Allergies:No Known Allergies      Labs:                          11.2   8.12  )-----------( 260      ( 08 Jan 2020 08:32 )             32.7     WBC Trend  8.12 Date (01-08 @ 08:32)  9.87 Date (01-07 @ 05:27)  13.51<H> Date (01-06 @ 12:10)      Chem  01-08    132<L>  |  99  |  14.0  ----------------------------<  102  4.4   |  20.0<L>  |  0.84    Ca    8.1<L>      08 Jan 2020 08:32  Phos  2.6     01-07  Mg     2.2     01-08      WBC Count: 8.12 K/uL (01-08 @ 08:32)  WBC Count: 9.87 K/uL (01-07 @ 05:27)  WBC Count: 13.51 K/uL (01-06 @ 12:10)        T(F): 99.4 (01-08-20 @ 09:32), Max: 102.7 (01-07-20 @ 20:08)  HR: 90 (01-08-20 @ 13:52) (90 - 110)  BP: 147/74 (01-08-20 @ 08:09) (98/53 - 147/74)  RR: 18 (01-08-20 @ 08:09) (18 - 35)  SpO2: 92% (01-08-20 @ 13:52) (91% - 98%)  Wt(kg): --    O:   General: Pleasant  male NAD & AOX3.    Integument:  Skin warm, dry and supple bilateral.    Ulceration Left plantar midfoot: + hyperkeratotic border, wound base Granular, wound size (3 cm X 4cm X 3cm) + edema, + cindy-wound erythema, + purulence, -fluctuance, - tracking/tunneling, +probe to bone.   Vascular: Dorsalis Pedis and Posterior Tibial pulses 1/4.  Capillary re-fill time less then 3 seconds digits 1-5 bilateral.    Neuro: Protective sensation grossly diminished to the level of the digits bilateral.  Deformity: BL Charcot deformity with midfoot break    ----------------------------------------------------------------------  < from: Xray Foot AP + Lateral + Oblique, Left (01.06.20 @ 15:06) >     EXAM:  FOOT-LEFT                          PROCEDURE DATE:  01/06/2020          INTERPRETATION:  EXAM:XR FOOT LEFT.     CLINICAL INDICATION: diabetic foot ulcer, fever, eval osteomyelitis .     TECHNIQUE: 3 views.     PRIOR EXAM: None.     FINDINGS:      Prior resection of the great toe at the level of mid first metatarsal. 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 Bony fusion across the proximal first through fifth metatarsals and tarsals. Reversal of the plantar arch.    Diffuse soft tissue swelling. Plantar foot ulcer. No radiographic evidence of adjacent osteomyelitis. Consider correlation with MR imaging.      IMPRESSION:     Plantar foot ulcer. No radiographic evidence of osteomyelitis.    Additional findings as above.                  SYDNEY LARA M.D., ATTENDING RADIOLOGIST  This document has been electronically signed. Jan 6 2020  3:28PM        < end of copied text >      A: Left foot plantar midfoot ulceration secondary to midfoot charcot breakdown  BL charcot feet      P:   Chart reviewed and Patient evaluated  Discussed diagnosis and treatment with patient  Wound flush with normal saline  Obtained wound culture to be sent to Pathology  Applied betadine with dry sterile dressing  X-rays reviewed, as above  MRI pending  Continue with IV antibiotics As Per ID  Ordered RICHARD  Non-weight bearing to left foot  May require surgical I&D of left foot- please medically clear for potential surgery on friday  Offloading to bilateral Heels.   Discussed importance of daily foot examinations and proper shoe gear and to importance of lower Fasting Blood Glucose levels.   Podiatry will follow while in house.  Discussed with Attending Dr Peterson S : 74y year old Male seen at bedside for Left plantar foot ulceration. Pt states he is visiting from [place] where he sees a podiatrist who has been applying alginate to the foot. Pt states he was here over the weekend, when he developed fevers and chills on Monday AM 1/6. Pt came to the hospital then.     Chief Complaint : Patient is a 74y old  Male who presents with a chief complaint of Septic shock (08 Jan 2020 09:18)    HPI : HPI:  75 y/o Male who presented with c/o Lethargy feeling generalized weakness fever and chills that started today and unaware of draining left foot ulcer with erythema had fever, elevated WBC, tachycardia, hypotension needed IVF-> MICU called for eval.   At baseline, patient's BP runs in 110's and takes enalapril 10 at home.   Has been "under the weather for couple of days and denied any other complaints   Since ICU saw the patient, had worsening of BP and needed to be started in IV Levophed and hence admitted to MICU. (06 Jan 2020 20:35)      Patient admits to  (+) Fevers, (+) Chills, (-) Nausea, (-) Vomiting, (-) Shortness of Breath      PMH: HLD (hyperlipidemia)  DM (diabetes mellitus)    PSH:Charcot foot due to diabetes mellitus      Allergies:No Known Allergies      Labs:                          11.2   8.12  )-----------( 260      ( 08 Jan 2020 08:32 )             32.7     WBC Trend  8.12 Date (01-08 @ 08:32)  9.87 Date (01-07 @ 05:27)  13.51<H> Date (01-06 @ 12:10)      Chem  01-08    132<L>  |  99  |  14.0  ----------------------------<  102  4.4   |  20.0<L>  |  0.84    Ca    8.1<L>      08 Jan 2020 08:32  Phos  2.6     01-07  Mg     2.2     01-08      WBC Count: 8.12 K/uL (01-08 @ 08:32)  WBC Count: 9.87 K/uL (01-07 @ 05:27)  WBC Count: 13.51 K/uL (01-06 @ 12:10)        T(F): 99.4 (01-08-20 @ 09:32), Max: 102.7 (01-07-20 @ 20:08)  HR: 90 (01-08-20 @ 13:52) (90 - 110)  BP: 147/74 (01-08-20 @ 08:09) (98/53 - 147/74)  RR: 18 (01-08-20 @ 08:09) (18 - 35)  SpO2: 92% (01-08-20 @ 13:52) (91% - 98%)  Wt(kg): --    O:   General: Pleasant  male NAD & AOX3.    Integument:  Skin warm, dry and supple bilateral.    Ulceration Left plantar midfoot: + hyperkeratotic border, wound base Granular, wound size (3 cm X 4cm X 3cm) + edema, + cindy-wound erythema, + purulence, -fluctuance, - tracking/tunneling, +probe to bone.   Vascular: Dorsalis Pedis and Posterior Tibial pulses 1/4.  Capillary re-fill time less then 3 seconds digits 1-5 bilateral.    Neuro: Protective sensation grossly diminished to the level of the digits bilateral.  Deformity: BL Charcot deformity with midfoot break    ----------------------------------------------------------------------  < from: Xray Foot AP + Lateral + Oblique, Left (01.06.20 @ 15:06) >     EXAM:  FOOT-LEFT                          PROCEDURE DATE:  01/06/2020          INTERPRETATION:  EXAM:XR FOOT LEFT.     CLINICAL INDICATION: diabetic foot ulcer, fever, eval osteomyelitis .     TECHNIQUE: 3 views.     PRIOR EXAM: None.     FINDINGS:      Prior resection of the great toe at the level of mid first metatarsal. 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 Bony fusion across the proximal first through fifth metatarsals and tarsals. Reversal of the plantar arch.    Diffuse soft tissue swelling. Plantar foot ulcer. No radiographic evidence of adjacent osteomyelitis. Consider correlation with MR imaging.      IMPRESSION:     Plantar foot ulcer. No radiographic evidence of osteomyelitis.    Additional findings as above.                  SYDNEY LARA M.D., ATTENDING RADIOLOGIST  This document has been electronically signed. Jan 6 2020  3:28PM        < end of copied text >      A: Left foot plantar midfoot ulceration secondary to midfoot charcot breakdown  BL charcot feet      P:   Chart reviewed and Patient evaluated  Discussed diagnosis and treatment with patient  Wound flush with normal saline  Obtained wound culture to be sent to Pathology  Applied betadine with dry sterile dressing  X-rays reviewed, as above  MRI pending  Ordered CT to r/o subcutaneous emphysema (gas) travelling to ankle  Continue with IV antibiotics As Per ID  Ordered RICHARD  Non-weight bearing to left foot  May require surgical I&D of left foot- please medically clear for potential surgery on friday  Offloading to bilateral Heels.   Discussed importance of daily foot examinations and proper shoe gear and to importance of lower Fasting Blood Glucose levels.   Podiatry will follow while in house.  Discussed with Attending Dr Peterson

## 2020-01-08 NOTE — CHART NOTE - NSCHARTNOTEFT_GEN_A_CORE
RN reported pt with tachycardia and fever oral Temp 103, despite Tylenol 2 hours earlier. Pt seen and examined at bedside; noted sitting up on side of bed, getting nebulizer treatment. Pt in no distress, with unlabored breathing. He denies shortness of breath, chest pain, nausea, vomiting, diarrhea, abdominal pain. This is a 73 y/o Male who presented with draining left foot ulcer with erythema admitted in ED with Septic shock secondary to infected diabetic foot ulcer. HEENT: normocephalic, atraumatic, Lungs clear to auscultation, Heart: S1/S2 RRR, no murm. Abd soft nontender, non-distended.  Motrin 400mg given.

## 2020-01-08 NOTE — PROGRESS NOTE ADULT - SUBJECTIVE AND OBJECTIVE BOX
CC: Septic shock (2020 16:32)    INTERVAL HPI/OVERNIGHT EVENTS:    Vital Signs Last 24 Hrs  T(C): 38.7 (2020 08:07), Max: 39.3 (2020 20:08)  T(F): 101.6 (2020 08:07), Max: 102.7 (2020 20:08)  HR: 99 (2020 08:09) (86 - 110)  BP: 147/74 (2020 08:09) (98/53 - 147/74)  BP(mean): 74 (2020 22:27) (69 - 94)  RR: 18 (2020 08:09) (18 - 35)  SpO2: 98% (2020 08:09) (91% - 98%)    PHYSICAL EXAM:  General: Well developed; well nourished; in no acute distress  Eyes: PERRLA, EOMI; conjunctiva and sclera clear  Head: Normocephalic; atraumatic  ENMT: No nasal discharge; airway clear  Neck: Supple; non tender; no masses  Respiratory: No wheezes, rales or rhonchi  Cardiovascular: Regular rate and rhythm. S1 and S2 Normal; No murmurs, gallops or rubs  Gastrointestinal: Soft non-tender non-distended; Normal bowel sounds  Genitourinary: No costovertebral angle tenderness  Extremities: Normal range of motion, No clubbing, cyanosis or edema  Vascular: Peripheral pulses palpable 2+ bilaterally  Neurological: Alert and oriented x4  Skin: Warm and dry. No acute rash  Lymph Nodes: No acute cervical adenopathy  Musculoskeletal: Normal gait, tone, without deformities  Psychiatric: Cooperative and appropriate    I&O's Detail    2020 07:01  -  2020 07:00  --------------------------------------------------------  IN:    Solution: 100 mL    Solution: 175 mL  Total IN: 275 mL    OUT:    Voided: 900 mL  Total OUT: 900 mL    Total NET: -625 mL    CARDIAC MARKERS ( 2020 12:10 )  x     / <0.01 ng/mL / x     / x     / x                            11.2   8.12  )-----------( 260      ( 2020 08:32 )             32.7     2020 08:32    132    |  99     |  14.0   ----------------------------<  102    4.4     |  20.0   |  0.84     Ca    8.1        2020 08:32  Phos  2.6       2020 05:27  Mg     2.2       2020 08:32    TPro  7.8    /  Alb  3.8    /  TBili  1.1    /  DBili  x      /  AST  25     /  ALT  14     /  AlkPhos  67     2020 12:10    PT/INR - ( 2020 12:10 )   PT: 14.9 sec;   INR: 1.29 ratio       PTT - ( 2020 12:10 )  PTT:29.7 sec    CAPILLARY BLOOD GLUCOSE  POCT Blood Glucose.: 94 mg/dL (2020 08:00)  POCT Blood Glucose.: 157 mg/dL (2020 22:52)  POCT Blood Glucose.: 146 mg/dL (2020 21:16)  POCT Blood Glucose.: 99 mg/dL (2020 17:24)  POCT Blood Glucose.: 124 mg/dL (2020 11:28)  POCT Blood Glucose.: 89 mg/dL (2020 09:23)    LIVER FUNCTIONS - ( 2020 12:10 )  Alb: 3.8 g/dL / Pro: 7.8 g/dL / ALK PHOS: 67 U/L / ALT: 14 U/L / AST: 25 U/L / GGT: x           Urinalysis Basic - ( 2020 15:31 )    Color: Yellow / Appearance: Clear / S.010 / pH: x  Gluc: x / Ketone: Trace  / Bili: Negative / Urobili: Negative mg/dL   Blood: x / Protein: 15 mg/dL / Nitrite: Negative   Leuk Esterase: Negative / RBC: x / WBC 0-2   Sq Epi: x / Non Sq Epi: Occasional / Bacteria: x    Hemoglobin A1C, Whole Blood: 7.7 % (20 @ 06:58)    MEDICATIONS  (STANDING):  atorvastatin 20 milliGRAM(s) Oral at bedtime  dextrose 5%. 1000 milliLiter(s) (50 mL/Hr) IV Continuous <Continuous>  dextrose 50% Injectable 12.5 Gram(s) IV Push once  dextrose 50% Injectable 25 Gram(s) IV Push once  dextrose 50% Injectable 25 Gram(s) IV Push once  enoxaparin Injectable 40 milliGRAM(s) SubCutaneous every 12 hours  insulin glargine Injectable (LANTUS) 40 Unit(s) SubCutaneous at bedtime  insulin lispro (HumaLOG) corrective regimen sliding scale   SubCutaneous Before meals and at bedtime  levalbuterol Inhalation 0.63 milliGRAM(s) Inhalation every 6 hours  midodrine 5 milliGRAM(s) Oral every 8 hours  piperacillin/tazobactam IVPB.. 3.375 Gram(s) IV Intermittent every 8 hours  saccharomyces boulardii 250 milliGRAM(s) Oral two times a day  venlafaxine XR. 75 milliGRAM(s) Oral daily    MEDICATIONS  (PRN):  acetaminophen   Tablet .. 650 milliGRAM(s) Oral every 6 hours PRN Temp greater or equal to 38C (100.4F), Mild Pain (1 - 3)  dextrose 40% Gel 15 Gram(s) Oral once PRN Blood Glucose LESS THAN 70 milliGRAM(s)/deciliter  glucagon  Injectable 1 milliGRAM(s) IntraMuscular once PRN Glucose LESS THAN 70 milligrams/deciliter  morphine  - Injectable 2 milliGRAM(s) IV Push every 4 hours PRN Moderate Pain (4 - 6)  morphine  - Injectable 2 milliGRAM(s) IV Push every 2 hours PRN Severe Pain (7 - 10)    RADIOLOGY & ADDITIONAL TESTS: CC: Septic shock (2020 16:32)    INTERVAL HPI/OVERNIGHT EVENTS:  no acute events  wife at bedside  patient without complaints  awaiting MRI    Vital Signs Last 24 Hrs  T(C): 38.7 (2020 08:07), Max: 39.3 (2020 20:08)  T(F): 101.6 (2020 08:07), Max: 102.7 (2020 20:08)  HR: 99 (2020 08:09) (86 - 110)  BP: 147/74 (2020 08:09) (98/53 - 147/74)  BP(mean): 74 (2020 22:27) (69 - 94)  RR: 18 (2020 08:09) (18 - 35)  SpO2: 98% (2020 08:09) (91% - 98%)    PHYSICAL EXAM:  General: Well developed; well nourished; in no acute distress; obese  ENMT: No nasal discharge; airway clear  Neck: Supple; non tender; no masses  Respiratory: No wheezes, rales or rhonchi  Cardiovascular: Regular rate and rhythm. S1 and S2 Normal; No murmurs, gallops or rubs  Gastrointestinal: Soft non-tender non-distended; obese abdomen; Normal bowel sounds  Extremities: Normal range of motion, No clubbing, cyanosis or edema; foot under gauze wrap, some serosanguinous fluid coming through  Vascular: Peripheral pulses palpable 2+ on right but under guaze on the left  Neurological: Alert and oriented x4  Skin: Warm and dry. No acute rash  Psychiatric: Cooperative and appropriate    I&O's Detail    2020 07:01  -  2020 07:00  --------------------------------------------------------  IN:    Solution: 100 mL    Solution: 175 mL  Total IN: 275 mL    OUT:    Voided: 900 mL  Total OUT: 900 mL    Total NET: -625 mL    CARDIAC MARKERS ( 2020 12:10 )  x     / <0.01 ng/mL / x     / x     / x                            11.2   8.12  )-----------( 260      ( 2020 08:32 )             32.7     2020 08:32    132    |  99     |  14.0   ----------------------------<  102    4.4     |  20.0   |  0.84     Ca    8.1        2020 08:32  Phos  2.6       2020 05:27  Mg     2.2       2020 08:32    TPro  7.8    /  Alb  3.8    /  TBili  1.1    /  DBili  x      /  AST  25     /  ALT  14     /  AlkPhos  67     2020 12:10    PT/INR - ( 2020 12:10 )   PT: 14.9 sec;   INR: 1.29 ratio       PTT - ( 2020 12:10 )  PTT:29.7 sec    CAPILLARY BLOOD GLUCOSE  POCT Blood Glucose.: 94 mg/dL (2020 08:00)  POCT Blood Glucose.: 157 mg/dL (2020 22:52)  POCT Blood Glucose.: 146 mg/dL (2020 21:16)  POCT Blood Glucose.: 99 mg/dL (2020 17:24)  POCT Blood Glucose.: 124 mg/dL (2020 11:28)  POCT Blood Glucose.: 89 mg/dL (2020 09:23)    LIVER FUNCTIONS - ( 2020 12:10 )  Alb: 3.8 g/dL / Pro: 7.8 g/dL / ALK PHOS: 67 U/L / ALT: 14 U/L / AST: 25 U/L / GGT: x           Urinalysis Basic - ( 2020 15:31 )    Color: Yellow / Appearance: Clear / S.010 / pH: x  Gluc: x / Ketone: Trace  / Bili: Negative / Urobili: Negative mg/dL   Blood: x / Protein: 15 mg/dL / Nitrite: Negative   Leuk Esterase: Negative / RBC: x / WBC 0-2   Sq Epi: x / Non Sq Epi: Occasional / Bacteria: x    Hemoglobin A1C, Whole Blood: 7.7 % (20 @ 06:58)    MEDICATIONS  (STANDING):  atorvastatin 20 milliGRAM(s) Oral at bedtime  dextrose 5%. 1000 milliLiter(s) (50 mL/Hr) IV Continuous <Continuous>  dextrose 50% Injectable 12.5 Gram(s) IV Push once  dextrose 50% Injectable 25 Gram(s) IV Push once  dextrose 50% Injectable 25 Gram(s) IV Push once  enoxaparin Injectable 40 milliGRAM(s) SubCutaneous every 12 hours  insulin glargine Injectable (LANTUS) 40 Unit(s) SubCutaneous at bedtime  insulin lispro (HumaLOG) corrective regimen sliding scale   SubCutaneous Before meals and at bedtime  levalbuterol Inhalation 0.63 milliGRAM(s) Inhalation every 6 hours  midodrine 5 milliGRAM(s) Oral every 8 hours  piperacillin/tazobactam IVPB.. 3.375 Gram(s) IV Intermittent every 8 hours  saccharomyces boulardii 250 milliGRAM(s) Oral two times a day  venlafaxine XR. 75 milliGRAM(s) Oral daily    MEDICATIONS  (PRN):  acetaminophen   Tablet .. 650 milliGRAM(s) Oral every 6 hours PRN Temp greater or equal to 38C (100.4F), Mild Pain (1 - 3)  dextrose 40% Gel 15 Gram(s) Oral once PRN Blood Glucose LESS THAN 70 milliGRAM(s)/deciliter  glucagon  Injectable 1 milliGRAM(s) IntraMuscular once PRN Glucose LESS THAN 70 milligrams/deciliter  morphine  - Injectable 2 milliGRAM(s) IV Push every 4 hours PRN Moderate Pain (4 - 6)  morphine  - Injectable 2 milliGRAM(s) IV Push every 2 hours PRN Severe Pain (7 - 10)    RADIOLOGY & ADDITIONAL TESTS:

## 2020-01-08 NOTE — PROGRESS NOTE ADULT - SUBJECTIVE AND OBJECTIVE BOX
[facility]  INFECTIOUS DISEASES AND INTERNAL MEDICINE at [place]  =======================================================  Hipolito Clark MD  Diplomates American Board of Internal Medicine and Infectious Diseases  Telephone [phone]  Fax            [phone]  =======================================================    [facility]-[id number]  JOAN OLIVERA   follow up: foot infection, proteus bacteremia     Culture reviewed.   GNR in wound from foot,.   Repeat blood cx  sent.       I have personally reviewed the labs and data; pertinent labs and data are listed in this note; please see below.   =======================================================   REVIEW OF SYSTEMS:  CONSTITUTIONAL:  POSITIVE Fevers AND chills  HEENT:  No diplopia or blurred vision.  No earache, sore throat or runny nose.  CARDIOVASCULAR:  No pressure, squeezing, strangling, tightness, heaviness or aching about the chest, neck, axilla or epigastrium.  RESPIRATORY:  No cough, shortness of breath  GASTROINTESTINAL:  No nausea, vomiting or diarrhea.  GENITOURINARY:  No dysuria, frequency or urgency. No Blood in urine  MUSCULOSKELETAL:  no joint aches, no muscle pain  SKIN:  No change in skin, hair or nails.  NEUROLOGIC:  No Headaches, seizures or weakness.  PSYCHIATRIC:  No disorder of thought or mood.  ENDOCRINE:  No heat or cold intolerance  HEMATOLOGICAL:  No easy bruising or bleeding.   =======================================================  Allergies  No Known Allergies     Antibiotics:  piperacillin/tazobactam IVPB.. 3.375 Gram(s) IV Intermittent every 8 hours    Other medications:  atorvastatin 20 milliGRAM(s) Oral at bedtime  dextrose 5%. 1000 milliLiter(s) IV Continuous <Continuous>  dextrose 50% Injectable 12.5 Gram(s) IV Push once  dextrose 50% Injectable 25 Gram(s) IV Push once  dextrose 50% Injectable 25 Gram(s) IV Push once  enoxaparin Injectable 40 milliGRAM(s) SubCutaneous every 12 hours  insulin glargine Injectable (LANTUS) 40 Unit(s) SubCutaneous at bedtime  insulin lispro (HumaLOG) corrective regimen sliding scale   SubCutaneous Before meals and at bedtime  levalbuterol Inhalation 0.63 milliGRAM(s) Inhalation every 6 hours  midodrine 5 milliGRAM(s) Oral every 8 hours  saccharomyces boulardii 250 milliGRAM(s) Oral two times a day  venlafaxine XR. 75 milliGRAM(s) Oral daily      ======================================================  Physical Exam:  ============  T(F): 99.4 (08 Jan 2020 09:32), Max: 102.7 (07 Jan 2020 20:08)  HR: 99 (08 Jan 2020 08:09)  BP: 147/74 (08 Jan 2020 08:09)  RR: 18 (08 Jan 2020 08:09)  SpO2: 98% (08 Jan 2020 08:09) (91% - 98%)  temp max in last 48H T(F): , Max: 102.7 (01-07-20 @ 20:08)    General:  No acute distress.  Eye: Pupils are equal, round and reactive to light, Extraocular movements are intact, Normal conjunctiva.  HENT: Normocephalic, Oral mucosa is moist, No pharyngeal erythema, No sinus tenderness.  Neck: Supple, No lymphadenopathy.  Respiratory: Lungs are clear to auscultation, Respirations are non-labored.  Cardiovascular: Normal rate, Regular rhythm,   Gastrointestinal: Soft, Non-tender, Non-distended, Normal bowel sounds.  Genitourinary: No costovertebral angle tenderness.  Lymphatics: No lymphadenopathy neck,   Musculoskeletal: Normal range of motion, Normal strength.  Integumentary: No rash.  Large LEFT MID FOOT PLANTAR ULCER WITH TAN/ MUCOID DISCHARGE.  NOT FOUL SMELLING, BUT THICK.   SURROUNDING WARMTH AND REDNESS OF ADJACENT SKIN  Neurologic: Alert, Oriented, No focal deficits, Cranial Nerves II-XII are grossly intact.  Psychiatric: Appropriate mood & affect.    =======================================================  Labs:                        11.2   8.12  )-----------( 260      ( 08 Jan 2020 08:32 )             32.7       WBC Count: 8.12 K/uL (01-08-20 @ 08:32)  WBC Count: 9.87 K/uL (01-07-20 @ 05:27)  WBC Count: 13.51 K/uL (01-06-20 @ 12:10)      01-08    132<L>  |  99  |  14.0  ----------------------------<  102  4.4   |  20.0<L>  |  0.84    Ca    8.1<L>      08 Jan 2020 08:32  Phos  2.6     01-07  Mg     2.2     01-08        Culture - Other (collected 01-07-20 @ 12:31)  Source: .Other left foot wound, site #2    Culture - Other (collected 01-07-20 @ 12:30)  Source: .Other left foot wound, site #1    Culture - Urine (collected 01-06-20 @ 21:32)  Source: .Urine  Final Report (01-07-20 @ 17:58):    No growth    Culture - Blood (collected 01-06-20 @ 12:14)  Source: .Blood  Organism: Proteus mirabilis  Blood Culture PCR (01-08-20 @ 09:31)  Organism: Proteus mirabilis (01-08-20 @ 09:31)    Sensitivities:      -  Amikacin: S <=16      -  Ampicillin: S <=8 These ampicillin results predict results for amoxicillin      -  Ampicillin/Sulbactam: S <=8/4 Enterobacter, Citrobacter, and Serratia may develop resistance during prolonged therapy (3-4 days)      -  Aztreonam: S <=4      -  Cefazolin: S <=8 Enterobacter, Citrobacter, and Serratia may develop resistance during prolonged therapy (3-4 days)      -  Cefepime: S <=4      -  Cefoxitin: S <=8      -  Ceftriaxone: S <=1 Enterobacter, Citrobacter, and Serratia may develop resistance during prolonged therapy      -  Ciprofloxacin: S <=1      -  Ertapenem: S <=1      -  Gentamicin: S <=4      -  Levofloxacin: S <=2      -  Meropenem: S <=1      -  Piperacillin/Tazobactam: S <=16      -  Tobramycin: S <=4      -  Trimethoprim/Sulfamethoxazole: S <=2/38      Method Type: ARIEL  Organism: Blood Culture PCR (01-07-20 @ 09:59)    Sensitivities:      -  Acinetobacter baumanii: Nondet      -  Candida albicans: Nondet      -  Candida glabrata: Nondet      -  Candida krusei: Nondet      -  Candida parapsilosis: Nondet      -  Candida tropicalis: Nondet      -  Coagulase negative Staphylococcus: Nondet      -  Enterobacter cloacae complex: Nondet      -  Enterococcus species: Nondet      -  Escherichia coli: Nondet      -  Haemophilus influenzae: Nondet      -  Klebsiella oxytoca: Nondet      -  Klebsiella pneumoniae: Nondet      -  Listeria monocytogenes: Nondet      -  Methicillin resistant Staphylococcus aureus (MRSA): Nondet      -  Multidrug (KPC pos) resistant organism: Nondet      -  Neisseria meningitidis: Nondet      -  Pseudomonas aeruginosa: Nondet      -  Serratia marcescens: Nondet      -  Staphylococcus aureus: Nondet      -  Streptococcus agalactiae (Group B): Nondet      -  Streptococcus pneumoniae: Nondet      -  Streptococcus pyogenes (Group A): Nondet      -  Streptococcus sp. (Not Grp A, B or S pneumoniae): Nondet      -  Vancomycin resistant Enterococcus sp.: Nondet      Method Type: PCR    Culture - Blood (collected 01-06-20 @ 12:13)  Source: .Blood  Organism: Proteus mirabilis (01-08-20 @ 09:36)  Organism: Proteus mirabilis (01-08-20 @ 09:36)    Sensitivities:      -  Amikacin: S <=16      -  Ampicillin: S <=8 These ampicillin results predict results for amoxicillin      -  Ampicillin/Sulbactam: S <=8/4 Enterobacter, Citrobacter, and Serratia may develop resistance during prolonged therapy (3-4 days)      -  Aztreonam: S <=4      -  Cefazolin: S <=8 Enterobacter, Citrobacter, and Serratia may develop resistance during prolonged therapy (3-4 days)      -  Cefepime: S <=4      -  Cefoxitin: S <=8      -  Ceftriaxone: S <=1 Enterobacter, Citrobacter, and Serratia may develop resistance during prolonged therapy      -  Ciprofloxacin: S <=1      -  Ertapenem: S <=1      -  Gentamicin: S <=4      -  Levofloxacin: S <=2      -  Meropenem: S <=1      -  Piperacillin/Tazobactam: S <=16      -  Tobramycin: S <=4      -  Trimethoprim/Sulfamethoxazole: S <=2/38      Method Type: ARIEL

## 2020-01-08 NOTE — PROGRESS NOTE ADULT - ASSESSMENT
75 y/o Male who presented with c/o Lethargy feeling generalized weakness fever and chills that started day of admission and patient was unaware of draining left foot ulcer with erythema. In the ED patient had fever, elevated WBC, tachycardia, hypotension requiring IVF resuscitation. MICU called for evaluation. Despite fluid resuscitation patient required levophed drip and was admitted to the ICU for septic shock from diabetic foot infection. On 1/6 blood cultures showed GNR. Repeat culture from the 7th pending along with wound culture. Septic shock resolved and patient downgraded on the 7th. Patient continued on zosyn. Podiatry and infectious disease.    #Severe sepsis/septic shock: resolved, still with hypotension and tachycardia  - 2/2 diabetic foot ulcer  - continue zosyn as below  - hold all anti-HTN  - c/w midodrine  - resume anti-HTN when feasible  - c/w IVF    #Diabetic foot ulcer, suspected OM  - c/w IV ABx as per ID. currently on Zosyn  - add florestor  - f/u all pending cultures  - MRI right foot with IC to r/o OM  - Echo: no vegetation  - wound care consult  - podiatry eval  - follows podiatry in [place]    #acute resp failure with hypoxia: patient is comfortable, per previous documentation no reported history of COPD but has a 40 year pack history. CXR shows no obvious infiltrate  - maintaining sat on NC  - duonebs ordered  - leg doppler to r/o DVT  - incentive spirometer  - if persistent may need CT chest    #DM:  - ISS, Accucheck  - A1c, lipid    #h/o AS  - echo today showed severe AS. Normal EF  - patient is asymptomatic  - follows cardiology in [place]    DVT-P: lovenox  Diet: consistent carb 73 y/o Male who presented with c/o Lethargy feeling generalized weakness fever and chills that started day of admission and patient was unaware of draining left foot ulcer with erythema. In the ED patient had fever, elevated WBC, tachycardia, hypotension requiring IVF resuscitation. MICU called for evaluation. Despite fluid resuscitation patient required levophed drip and was admitted to the ICU for septic shock from diabetic foot infection. On 1/6 blood cultures showed GNR. Repeat culture from the 7th pending along with wound culture. Septic shock resolved and patient downgraded on the 7th. Patient continued on zosyn. Podiatry and infectious disease.    #Severe sepsis/septic shock: resolved, still with hypotension and tachycardia  - 2/2 diabetic foot ulcer  - continue zosyn as below  - hold all anti-HTN  - c/w midodrine  - resume anti-HTN when feasible  - c/w IVF    #Diabetic foot ulcer, suspected OM  - c/w IV ABx as per ID. currently on Zosyn  - added florestor  - f/u all pending cultures  - MRI right foot with IC to r/o OM  - Echo: no vegetation  - wound care consult  - podiatry eval  - follows podiatry in [place]    #acute resp failure with hypoxia: patient is comfortable, per previous documentation no reported history of COPD but has a 40 year pack history. CXR shows no obvious infiltrate  - maintaining sat on NC  - duonebs ordered  - leg doppler to r/o DVT  - incentive spirometer  - patient this afternoon when seen is off oxygen    #DM:  - ISS, Accucheck  - A1c, lipid    #h/o AS  - echo today showed severe AS. Normal EF  - patient is asymptomatic  - follows cardiology in [place]    DVT-P: lovenox  Diet: consistent carb    Dispo: MRI needed for further disposition planning; but possibly discharge with IV antibiotics home in 1-2 days 73 y/o Male who presented with c/o Lethargy feeling generalized weakness fever and chills that started day of admission and patient was unaware of draining left foot ulcer with erythema. In the ED patient had fever, elevated WBC, tachycardia, hypotension requiring IVF resuscitation. MICU called for evaluation. Despite fluid resuscitation patient required levophed drip and was admitted to the ICU for septic shock from diabetic foot infection. On 1/6 blood cultures showed GNR. Repeat culture from the 7th pending along with wound culture. Septic shock resolved and patient downgraded on the 7th. Patient continued on zosyn. Podiatry and infectious disease.    #Severe sepsis/septic shock: resolved, still with hypotension and tachycardia  - 2/2 diabetic foot ulcer  - continue zosyn as below  - hold all anti-HTN  - c/w midodrine  - resume anti-HTN when feasible  - c/w IVF    #Diabetic foot ulcer, suspected OM  - c/w IV ABx as per ID. currently on Zosyn  - added florestor  - f/u all pending cultures - repeat BC pending, wound culture shows GNR, originaly BC show Proteus Mirabilus  - MRI right foot with IC to r/o OM  - Echo: no vegetation  - wound care consult  - podiatry eval - paged today  - follows podiatry in [place]    #acute resp failure with hypoxia: patient is comfortable, per previous documentation no reported history of COPD but has a 40 year pack history. CXR shows no obvious infiltrate  - maintaining sat on NC  - duonebs ordered  - leg doppler to r/o DVT  - incentive spirometer  - patient this afternoon when seen is off oxygen    #DM:  - ISS, Accucheck  - A1c, lipid    #h/o AS  - echo today showed severe AS. Normal EF  - patient is asymptomatic  - follows cardiology in [place]    DVT-P: lovenox  Diet: consistent carb    Dispo: MRI needed for further disposition planning; but possibly discharge with IV antibiotics home in 1-2 days; will see if MRI needs IV contrast

## 2020-01-08 NOTE — PROGRESS NOTE ADULT - ASSESSMENT
73 y/o Male who presented with c/o Lethargy feeling generalized weakness fever and chills that started today and unaware of draining left foot ulcer with erythema had fever, elevated WBC, tachycardia, hypotension needed IVF-> MICU called for eval.   At baseline, patient's BP runs in 110's and takes enalapril 10 at home.   Has been "under the weather for couple of days and denied any other complaints   Since ICU saw the patient, had worsening of BP and needed to be started in IV Levophed and hence admitted to MICU. (2020 20:35)    He was up here for a  before getting sick,.. Prior coming in, he had shivers and chills at hotel.  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  Has a wound in his left foot for 2 years, being cared for by local podiatrist in [place].  Had a prior hx of long term IV ABX for foot infection in the past, going to local clinic for daily IV antibiotics - it was the least expensive route per patient.     Cultures from blood - aerobic x 2 sets with gram negative rods, identification not picked up on PCR.   wound cx sent x 2 from bedside,.   repeat Blood cx ordered 1/7/19 x 2 set by myself.     Denies antibiotics allergies.   Impression:  diabetic foot ulcer  local cellulitis  possible Osteomyelitis  gram negative bacteremia  Left charcot foot   diabetic neuropathy  febrile illness      Plan:  - concern for osteomyelitis of foot given that ulcer present for 2 years.  ESR is elevated  PENDING MRI  - foot wound cx sent; Gram NEGATIVES IN GRAM STAIN    regarding bacteremia with PROTEUS  - repeat blood cx x 2 sets SEND ON 2020  - continue zosyn FOR NOW    DM2  - appears to be in fair control   Hemoglobin A1C, Whole Blood: 7.7 % (20 @ 06:58)  - continue DM management      - follow up all outstanding cultures  - trend temperature and WBC curve  - repeat cultures from blood and all sources if febrile.

## 2020-01-09 LAB
-  AMIKACIN: SIGNIFICANT CHANGE UP
-  AMPICILLIN/SULBACTAM: SIGNIFICANT CHANGE UP
-  AMPICILLIN: SIGNIFICANT CHANGE UP
-  AZTREONAM: SIGNIFICANT CHANGE UP
-  CEFAZOLIN: SIGNIFICANT CHANGE UP
-  CEFEPIME: SIGNIFICANT CHANGE UP
-  CEFOXITIN: SIGNIFICANT CHANGE UP
-  CEFTRIAXONE: SIGNIFICANT CHANGE UP
-  CIPROFLOXACIN: SIGNIFICANT CHANGE UP
-  ERTAPENEM: SIGNIFICANT CHANGE UP
-  GENTAMICIN: SIGNIFICANT CHANGE UP
-  IMIPENEM: SIGNIFICANT CHANGE UP
-  IMIPENEM: SIGNIFICANT CHANGE UP
-  LEVOFLOXACIN: SIGNIFICANT CHANGE UP
-  MEROPENEM: SIGNIFICANT CHANGE UP
-  PIPERACILLIN/TAZOBACTAM: SIGNIFICANT CHANGE UP
-  TOBRAMYCIN: SIGNIFICANT CHANGE UP
-  TRIMETHOPRIM/SULFAMETHOXAZOLE: SIGNIFICANT CHANGE UP
CULTURE RESULTS: NO GROWTH — SIGNIFICANT CHANGE UP
CULTURE RESULTS: SIGNIFICANT CHANGE UP
CULTURE RESULTS: SIGNIFICANT CHANGE UP
GLUCOSE BLDC GLUCOMTR-MCNC: 166 MG/DL — HIGH (ref 70–99)
GLUCOSE BLDC GLUCOMTR-MCNC: 193 MG/DL — HIGH (ref 70–99)
GLUCOSE BLDC GLUCOMTR-MCNC: 201 MG/DL — HIGH (ref 70–99)
GLUCOSE BLDC GLUCOMTR-MCNC: 228 MG/DL — HIGH (ref 70–99)
METHOD TYPE: SIGNIFICANT CHANGE UP
ORGANISM # SPEC MICROSCOPIC CNT: SIGNIFICANT CHANGE UP
SPECIMEN SOURCE: SIGNIFICANT CHANGE UP

## 2020-01-09 PROCEDURE — 73718 MRI LOWER EXTREMITY W/O DYE: CPT | Mod: 26,LT

## 2020-01-09 PROCEDURE — 93010 ELECTROCARDIOGRAM REPORT: CPT

## 2020-01-09 PROCEDURE — 73700 CT LOWER EXTREMITY W/O DYE: CPT | Mod: 26,LT

## 2020-01-09 PROCEDURE — 99232 SBSQ HOSP IP/OBS MODERATE 35: CPT

## 2020-01-09 RX ORDER — SODIUM CHLORIDE 9 MG/ML
500 INJECTION INTRAMUSCULAR; INTRAVENOUS; SUBCUTANEOUS ONCE
Refills: 0 | Status: COMPLETED | OUTPATIENT
Start: 2020-01-09 | End: 2020-01-09

## 2020-01-09 RX ADMIN — LEVALBUTEROL 0.63 MILLIGRAM(S): 1.25 SOLUTION, CONCENTRATE RESPIRATORY (INHALATION) at 03:40

## 2020-01-09 RX ADMIN — PIPERACILLIN AND TAZOBACTAM 25 GRAM(S): 4; .5 INJECTION, POWDER, LYOPHILIZED, FOR SOLUTION INTRAVENOUS at 05:48

## 2020-01-09 RX ADMIN — Medication 4: at 17:09

## 2020-01-09 RX ADMIN — MIDODRINE HYDROCHLORIDE 5 MILLIGRAM(S): 2.5 TABLET ORAL at 13:58

## 2020-01-09 RX ADMIN — PIPERACILLIN AND TAZOBACTAM 25 GRAM(S): 4; .5 INJECTION, POWDER, LYOPHILIZED, FOR SOLUTION INTRAVENOUS at 13:57

## 2020-01-09 RX ADMIN — LEVALBUTEROL 0.63 MILLIGRAM(S): 1.25 SOLUTION, CONCENTRATE RESPIRATORY (INHALATION) at 14:44

## 2020-01-09 RX ADMIN — ATORVASTATIN CALCIUM 20 MILLIGRAM(S): 80 TABLET, FILM COATED ORAL at 22:34

## 2020-01-09 RX ADMIN — Medication 2: at 12:27

## 2020-01-09 RX ADMIN — ENOXAPARIN SODIUM 40 MILLIGRAM(S): 100 INJECTION SUBCUTANEOUS at 22:34

## 2020-01-09 RX ADMIN — Medication 250 MILLIGRAM(S): at 17:10

## 2020-01-09 RX ADMIN — MIDODRINE HYDROCHLORIDE 5 MILLIGRAM(S): 2.5 TABLET ORAL at 05:48

## 2020-01-09 RX ADMIN — MIDODRINE HYDROCHLORIDE 5 MILLIGRAM(S): 2.5 TABLET ORAL at 22:34

## 2020-01-09 RX ADMIN — SODIUM CHLORIDE 1000 MILLILITER(S): 9 INJECTION INTRAMUSCULAR; INTRAVENOUS; SUBCUTANEOUS at 17:10

## 2020-01-09 RX ADMIN — ENOXAPARIN SODIUM 40 MILLIGRAM(S): 100 INJECTION SUBCUTANEOUS at 12:26

## 2020-01-09 RX ADMIN — PIPERACILLIN AND TAZOBACTAM 25 GRAM(S): 4; .5 INJECTION, POWDER, LYOPHILIZED, FOR SOLUTION INTRAVENOUS at 22:34

## 2020-01-09 RX ADMIN — LEVALBUTEROL 0.63 MILLIGRAM(S): 1.25 SOLUTION, CONCENTRATE RESPIRATORY (INHALATION) at 08:09

## 2020-01-09 RX ADMIN — Medication 4: at 22:35

## 2020-01-09 RX ADMIN — LEVALBUTEROL 0.63 MILLIGRAM(S): 1.25 SOLUTION, CONCENTRATE RESPIRATORY (INHALATION) at 22:17

## 2020-01-09 RX ADMIN — Medication 75 MILLIGRAM(S): at 11:27

## 2020-01-09 RX ADMIN — Medication 250 MILLIGRAM(S): at 05:48

## 2020-01-09 RX ADMIN — INSULIN GLARGINE 40 UNIT(S): 100 INJECTION, SOLUTION SUBCUTANEOUS at 22:34

## 2020-01-09 RX ADMIN — Medication 2: at 09:06

## 2020-01-09 NOTE — PROGRESS NOTE ADULT - SUBJECTIVE AND OBJECTIVE BOX
[facility]  INFECTIOUS DISEASES AND INTERNAL MEDICINE at [place]  =======================================================  Hipolito Clark MD  Diplomates American Board of Internal Medicine and Infectious Diseases  Telephone [phone]  Fax            [phone]  =======================================================    [id number]  JOAN OLIVERA   follow up: foot infection, proteus bacteremia     Culture reviewed.   GNR in wound from foot,.   Repeat blood cx  sent.  in process      =======================================================   REVIEW OF SYSTEMS:  CONSTITUTIONAL:  POSITIVE Fevers AND chills  HEENT:  No diplopia or blurred vision.  No earache, sore throat or runny nose.  CARDIOVASCULAR:  No pressure, squeezing, strangling, tightness, heaviness or aching about the chest, neck, axilla or epigastrium.  RESPIRATORY:  No cough, shortness of breath  GASTROINTESTINAL:  No nausea, vomiting or diarrhea.  GENITOURINARY:  No dysuria, frequency or urgency. No Blood in urine  MUSCULOSKELETAL:  no joint aches, no muscle pain  SKIN:  No change in skin, hair or nails.  NEUROLOGIC:  No Headaches, seizures or weakness.  PSYCHIATRIC:  No disorder of thought or mood.  ENDOCRINE:  No heat or cold intolerance  HEMATOLOGICAL:  No easy bruising or bleeding.   =======================================================  Allergies  No Known Allergies     Antibiotics:  piperacillin/tazobactam IVPB.. 3.375 Gram(s) IV Intermittent every 8 hours    Other medications:  atorvastatin 20 milliGRAM(s) Oral at bedtime  dextrose 5%. 1000 milliLiter(s) IV Continuous <Continuous>  dextrose 50% Injectable 12.5 Gram(s) IV Push once  dextrose 50% Injectable 25 Gram(s) IV Push once  dextrose 50% Injectable 25 Gram(s) IV Push once  enoxaparin Injectable 40 milliGRAM(s) SubCutaneous every 12 hours  insulin glargine Injectable (LANTUS) 40 Unit(s) SubCutaneous at bedtime  insulin lispro (HumaLOG) corrective regimen sliding scale   SubCutaneous Before meals and at bedtime  levalbuterol Inhalation 0.63 milliGRAM(s) Inhalation every 6 hours  midodrine 5 milliGRAM(s) Oral every 8 hours  saccharomyces boulardii 250 milliGRAM(s) Oral two times a day  venlafaxine XR. 75 milliGRAM(s) Oral daily      ======================================================  Physical Exam:  ============  T(F): 97.8 (09 Jan 2020 08:13), Max: 103 (08 Jan 2020 19:30)  HR: 90 (09 Jan 2020 08:19)  BP: 127/74 (09 Jan 2020 08:13)  RR: 18 (09 Jan 2020 08:13)  SpO2: 94% (09 Jan 2020 08:13) (92% - 97%)  temp max in last 48H T(F): , Max: 103 (01-08-20 @ 19:30)    General:  No acute distress.  Eye: Pupils are equal, round and reactive to light, Extraocular movements are intact, Normal conjunctiva.  HENT: Normocephalic, Oral mucosa is moist, No pharyngeal erythema, No sinus tenderness.  Neck: Supple, No lymphadenopathy.  Respiratory: Lungs are clear to auscultation, Respirations are non-labored.  Cardiovascular: Normal rate, Regular rhythm,   Gastrointestinal: Soft, Non-tender, Non-distended, Normal bowel sounds.  Genitourinary: No costovertebral angle tenderness.  Lymphatics: No lymphadenopathy neck,   Musculoskeletal: Normal range of motion, Normal strength.  Integumentary: No rash.  Large LEFT MID FOOT PLANTAR ULCER WITH TAN/ MUCOID DISCHARGE.  NOT FOUL SMELLING, BUT THICK.   SURROUNDING WARMTH AND REDNESS OF ADJACENT SKIN  Neurologic: Alert, Oriented, No focal deficits, Cranial Nerves II-XII are grossly intact.  Psychiatric: Appropriate mood & affect.    =======================================================  Labs:                        11.2   8.12  )-----------( 260      ( 08 Jan 2020 08:32 )             32.7       WBC Count: 8.12 K/uL (01-08-20 @ 08:32)  WBC Count: 9.87 K/uL (01-07-20 @ 05:27)  WBC Count: 13.51 K/uL (01-06-20 @ 12:10)      01-08    132<L>  |  99  |  14.0  ----------------------------<  102  4.4   |  20.0<L>  |  0.84    Ca    8.1<L>      08 Jan 2020 08:32  Mg     2.2     01-08        Culture - Surgical Swab (collected 01-08-20 @ 15:35)  Source: .Surgical Swab left foot  Gram Stain (01-08-20 @ 16:44):    Few White blood cells    Few Gram Negative Rods    Culture - Other (collected 01-07-20 @ 12:31)  Source: .Other left foot wound, site #2    Culture - Other (collected 01-07-20 @ 12:30)  Source: .Other left foot wound, site #1    Culture - Urine (collected 01-06-20 @ 21:32)  Source: .Urine  Final Report (01-07-20 @ 17:58):    No growth    Culture - Blood (collected 01-06-20 @ 12:14)  Source: .Blood  Organism: Proteus mirabilis  Blood Culture PCR (01-08-20 @ 09:31)  Organism: Proteus mirabilis (01-08-20 @ 09:31)    Sensitivities:      -  Amikacin: S <=16      -  Ampicillin: S <=8 These ampicillin results predict results for amoxicillin      -  Ampicillin/Sulbactam: S <=8/4 Enterobacter, Citrobacter, and Serratia may develop resistance during prolonged therapy (3-4 days)      -  Aztreonam: S <=4      -  Cefazolin: S <=8 Enterobacter, Citrobacter, and Serratia may develop resistance during prolonged therapy (3-4 days)      -  Cefepime: S <=4      -  Cefoxitin: S <=8      -  Ceftriaxone: S <=1 Enterobacter, Citrobacter, and Serratia may develop resistance during prolonged therapy      -  Ciprofloxacin: S <=1      -  Ertapenem: S <=1      -  Gentamicin: S <=4      -  Levofloxacin: S <=2      -  Meropenem: S <=1      -  Piperacillin/Tazobactam: S <=16      -  Tobramycin: S <=4      -  Trimethoprim/Sulfamethoxazole: S <=2/38      Method Type: ARIEL  Organism: Blood Culture PCR (01-07-20 @ 09:59)       Method Type: PCR    Culture - Blood (collected 01-06-20 @ 12:13)  Source: .Blood  Organism: Proteus mirabilis (01-08-20 @ 09:36)  Organism: Proteus mirabilis (01-08-20 @ 09:36)    Sensitivities:      -  Amikacin: S <=16      -  Ampicillin: S <=8 These ampicillin results predict results for amoxicillin      -  Ampicillin/Sulbactam: S <=8/4 Enterobacter, Citrobacter, and Serratia may develop resistance during prolonged therapy (3-4 days)      -  Aztreonam: S <=4      -  Cefazolin: S <=8 Enterobacter, Citrobacter, and Serratia may develop resistance during prolonged therapy (3-4 days)      -  Cefepime: S <=4      -  Cefoxitin: S <=8      -  Ceftriaxone: S <=1 Enterobacter, Citrobacter, and Serratia may develop resistance during prolonged therapy      -  Ciprofloxacin: S <=1      -  Ertapenem: S <=1      -  Gentamicin: S <=4      -  Levofloxacin: S <=2      -  Meropenem: S <=1      -  Piperacillin/Tazobactam: S <=16      -  Tobramycin: S <=4      -  Trimethoprim/Sulfamethoxazole: S <=2/38      Method Type: ARIEL

## 2020-01-09 NOTE — PROGRESS NOTE ADULT - ASSESSMENT
73 y/o Male who presented with c/o Lethargy feeling generalized weakness fever and chills that started day of admission and patient was unaware of draining left foot ulcer with erythema. In the ED patient had fever, elevated WBC, tachycardia, hypotension requiring IVF resuscitation. MICU called for evaluation. Despite fluid resuscitation patient required levophed drip and was admitted to the ICU for septic shock from diabetic foot infection.     On 1/6 blood cultures showed GNR. Repeat culture from the 7th pending along with wound culture. Septic shock resolved and patient downgraded on the 1/7. Patient continued on zosyn. Podiatry and infectious disease consulted.    CT performed on 1/9 showed no collection/air, possible osteo, MRI performed also on 1/9, pending read. Patient for OR tomorrow for I and D with podiatry.    Per ID if patient without resistant organisms he will be discharged with outpatient follow up with his care providers in [place] where he is from. He will go on levaquin x 14 days PO to bridge him to those appointments.    #Severe sepsis/septic shock: resolved, still with hypotension and tachycardia  - 2/2 diabetic foot ulcer  - continue zosyn as below  - hold all anti-HTN  - c/w midodrine  - resume anti-HTN when feasible  - c/w IVF    #Diabetic foot ulcer, suspected OM  - c/w IV ABx as per ID. currently on Zosyn  - added florestor  - f/u all pending cultures - repeat BC pending, wound culture shows GNR, originaly BC show Proteus Mirabilus  - MRI right foot with IC to r/o OM  - Echo: no vegetation  - wound care consult  - podiatry eval - may debride at bedside in order to avoid having to take to OR  - patient medically stable for OR tomorrow if able to get OR time  - alternatively patient would like to go home with oral antibiotics if not able to perform tomorrow as he is not from this area and prefers to follow up with his own surgeons and physicians    #acute resp failure with hypoxia: resolved  - leg doppler negative for clot  - incentive spirometer  - patient off oxygen    #DM:  - ISS, Accucheck  - A1c, lipid    #h/o AS  - echo today showed severe AS. Normal EF  - patient is asymptomatic  - follows cardiology in [place]    DVT-P: lovenox  Diet: consistent carb    Dispo: MRI needed for further disposition planning; but possibly discharge home with PO antibiotics tomorrow.

## 2020-01-09 NOTE — PROGRESS NOTE ADULT - SUBJECTIVE AND OBJECTIVE BOX
CC: Septic shock (07 Jan 2020 16:32)    INTERVAL HPI/OVERNIGHT EVENTS:  no acute events  CT of affected extremity shows possible osteo but no collection or soft tissue air  seen by podiatry who want to take to OR for I+D  MRI performed but not read  no fevers overnight  wife at bedside    Vital Signs Last 24 Hrs  T(C): 38.7 (08 Jan 2020 08:07), Max: 39.3 (07 Jan 2020 20:08)  T(F): 101.6 (08 Jan 2020 08:07), Max: 102.7 (07 Jan 2020 20:08)  HR: 99 (08 Jan 2020 08:09) (86 - 110)  BP: 147/74 (08 Jan 2020 08:09) (98/53 - 147/74)  BP(mean): 74 (07 Jan 2020 22:27) (69 - 94)  RR: 18 (08 Jan 2020 08:09) (18 - 35)  SpO2: 98% (08 Jan 2020 08:09) (91% - 98%)    PHYSICAL EXAM:  General: Well developed; well nourished; in no acute distress; obese  ENMT: No nasal discharge; airway clear  Neck: Supple; non tender; no masses  Respiratory: No wheezes, rales or rhonchi  Cardiovascular: Regular rate and rhythm. S1 and S2 Normal; No murmurs, gallops or rubs  Gastrointestinal: Soft non-tender non-distended; obese abdomen; Normal bowel sounds  Extremities: Normal range of motion, No clubbing, cyanosis or edema; foot under gauze wrap, some serosanguinous fluid coming through  Vascular: Peripheral pulses palpable 2+ on right but under guaze on the left  Neurological: Alert and oriented x4  Skin: Warm and dry. No acute rash  Psychiatric: Cooperative and appropriate    CAPILLARY BLOOD GLUCOSE  POCT Blood Glucose.: 166 mg/dL (09 Jan 2020 07:46)  POCT Blood Glucose.: 278 mg/dL (08 Jan 2020 21:52)  POCT Blood Glucose.: 236 mg/dL (08 Jan 2020 16:59)  POCT Blood Glucose.: 121 mg/dL (08 Jan 2020 12:06)    Culture - Other (01.07.20 @ 12:31)    Specimen Source: .Other left foot wound, site #2    Culture Results:   Few Gram Negative Rods Identification and susceptibility to follow.  Culture in progress    Culture - Surgical Swab (01.08.20 @ 15:35)    Gram Stain:   Few White blood cells  Few Gram Negative Rods    Specimen Source: .Surgical Swab left foot    Culture - Blood (01.06.20 @ 12:14)    -  Amikacin: S <=16    -  Ampicillin: S <=8 These ampicillin results predict results for amoxicillin    -  Ampicillin/Sulbactam: S <=8/4 Enterobacter, Citrobacter, and Serratia may develop resistance during prolonged therapy (3-4 days)    -  Aztreonam: S <=4    -  Cefazolin: S <=8 Enterobacter, Citrobacter, and Serratia may develop resistance during prolonged therapy (3-4 days)    -  Cefepime: S <=4    -  Cefoxitin: S <=8    -  Ceftriaxone: S <=1 Enterobacter, Citrobacter, and Serratia may develop resistance during prolonged therapy    -  Ciprofloxacin: S <=1    -  Ertapenem: S <=1    -  Gentamicin: S <=4    -  Levofloxacin: S <=2    -  Meropenem: S <=1    -  Piperacillin/Tazobactam: S <=16    -  Tobramycin: S <=4    -  Trimethoprim/Sulfamethoxazole: S <=2/38    -  Multidrug (KPC pos) resistant organism: Nondet    -  Staphylococcus aureus: Nondet    -  Methicillin resistant Staphylococcus aureus (MRSA): Nondet    -  Coagulase negative Staphylococcus: Nondet    -  Enterococcus species: Nondet    -  Vancomycin resistant Enterococcus sp.: Nondet    -  Escherichia coli: Nondet    -  Klebsiella oxytoca: Nondet    -  Klebsiella pneumoniae: Nondet    -  Serratia marcescens: Nondet    -  Haemophilus influenzae: Nondet    -  Listeria monocytogenes: Nondet    -  Neisseria meningitidis: Nondet    -  Pseudomonas aeruginosa: Nondet    -  Acinetobacter baumanii: Nondet    -  Enterobacter cloacae complex: Nondet    -  Streptococcus sp. (Not Grp A, B or S pneumoniae): Nondet    -  Streptococcus agalactiae (Group B): Nondet    -  Streptococcus pyogenes (Group A): Nondet    -  Streptococcus pneumoniae: Nondet    -  Candida albicans: Nondet    -  Candida glabrata: Nondet    -  Candida krusei: Nondet    -  Candida parapsilosis: Nondet    -  Candida tropicalis: Nondet    Specimen Source: .Blood    Organism: Blood Culture PCR    Organism: Proteus mirabilis    Culture Results:   Growth in aerobic bottle: Proteus mirabilis  Aerobic Bottle: 18.08 Hours to positivity  Anaerobic Bottle: No growth to date    MEDICATIONS  (STANDING):  atorvastatin 20 milliGRAM(s) Oral at bedtime  dextrose 5%. 1000 milliLiter(s) (50 mL/Hr) IV Continuous <Continuous>  dextrose 50% Injectable 12.5 Gram(s) IV Push once  dextrose 50% Injectable 25 Gram(s) IV Push once  dextrose 50% Injectable 25 Gram(s) IV Push once  enoxaparin Injectable 40 milliGRAM(s) SubCutaneous every 12 hours  insulin glargine Injectable (LANTUS) 40 Unit(s) SubCutaneous at bedtime  insulin lispro (HumaLOG) corrective regimen sliding scale   SubCutaneous Before meals and at bedtime  levalbuterol Inhalation 0.63 milliGRAM(s) Inhalation every 6 hours  midodrine 5 milliGRAM(s) Oral every 8 hours  piperacillin/tazobactam IVPB.. 3.375 Gram(s) IV Intermittent every 8 hours  saccharomyces boulardii 250 milliGRAM(s) Oral two times a day  venlafaxine XR. 75 milliGRAM(s) Oral daily    MEDICATIONS  (PRN):  acetaminophen   Tablet .. 650 milliGRAM(s) Oral every 6 hours PRN Temp greater or equal to 38C (100.4F), Mild Pain (1 - 3)  dextrose 40% Gel 15 Gram(s) Oral once PRN Blood Glucose LESS THAN 70 milliGRAM(s)/deciliter  glucagon  Injectable 1 milliGRAM(s) IntraMuscular once PRN Glucose LESS THAN 70 milligrams/deciliter  morphine  - Injectable 2 milliGRAM(s) IV Push every 4 hours PRN Moderate Pain (4 - 6)  morphine  - Injectable 2 milliGRAM(s) IV Push every 2 hours PRN Severe Pain (7 - 10)    RADIOLOGY & ADDITIONAL TESTS:

## 2020-01-09 NOTE — PROGRESS NOTE ADULT - SUBJECTIVE AND OBJECTIVE BOX
S : 74y year old Male seen at bedside for Left plantar foot ulceration. Pt states he is visiting from [place] where he sees a podiatrist who has been applying alginate to the foot. Pt states he was here over the weekend, when he developed fevers and chills on Monday AM 1/6. Pt came to the hospital then.     Chief Complaint : Patient is a 74y old  Male who presents with a chief complaint of Septic shock (08 Jan 2020 09:18)    HPI : HPI:  75 y/o Male who presented with c/o Lethargy feeling generalized weakness fever and chills that started today and unaware of draining left foot ulcer with erythema had fever, elevated WBC, tachycardia, hypotension needed IVF-> MICU called for eval.   At baseline, patient's BP runs in 110's and takes enalapril 10 at home.   Has been "under the weather for couple of days and denied any other complaints   Since ICU saw the patient, had worsening of BP and needed to be started in IV Levophed and hence admitted to MICU. (06 Jan 2020 20:35)      Patient admits to  (+) Fevers, (+) Chills, (-) Nausea, (-) Vomiting, (-) Shortness of Breath      PMH: HLD (hyperlipidemia)  DM (diabetes mellitus)    PSH:Charcot foot due to diabetes mellitus      Allergies:No Known Allergies      Labs:  Vital Signs Last 24 Hrs  T(C): 36.6 (09 Jan 2020 08:13), Max: 39.4 (08 Jan 2020 19:30)  T(F): 97.8 (09 Jan 2020 08:13), Max: 103 (08 Jan 2020 19:30)  HR: 90 (09 Jan 2020 08:19) (75 - 125)  BP: 127/74 (09 Jan 2020 08:13) (117/51 - 138/84)  BP(mean): --  RR: 18 (09 Jan 2020 08:13) (18 - 23)  SpO2: 94% (09 Jan 2020 08:13) (92% - 97%)                          11.2   8.12  )-----------( 260      ( 08 Jan 2020 08:32 )             32.7     01-08    132<L>  |  99  |  14.0  ----------------------------<  102  4.4   |  20.0<L>  |  0.84    Ca    8.1<L>      08 Jan 2020 08:32  Mg     2.2     01-08      O:   General: Pleasant  male NAD & AOX3.    Integument:  Skin warm, dry and supple bilateral.    Ulceration Left plantar midfoot: + hyperkeratotic border, wound base Granular, wound size (3 cm X 4cm X 3cm) + edema, + cindy-wound erythema, + purulence, -fluctuance, - tracking/tunneling, +probe to bone.   Vascular: Dorsalis Pedis and Posterior Tibial pulses 1/4.  Capillary re-fill time less then 3 seconds digits 1-5 bilateral.    Neuro: Protective sensation grossly diminished to the level of the digits bilateral.  Deformity: BL Charcot deformity with midfoot break    ----------------------------------------------------------------------  < from: Xray Foot AP + Lateral + Oblique, Left (01.06.20 @ 15:06) >     EXAM:  FOOT-LEFT                          PROCEDURE DATE:  01/06/2020          INTERPRETATION:  EXAM:XR FOOT LEFT.     CLINICAL INDICATION: diabetic foot ulcer, fever, eval osteomyelitis .     TECHNIQUE: 3 views.     PRIOR EXAM: None.     FINDINGS:      Prior resection of the great toe at the level of mid first metatarsal. 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 Bony fusion across the proximal first through fifth metatarsals and tarsals. Reversal of the plantar arch.    Diffuse soft tissue swelling. Plantar foot ulcer. No radiographic evidence of adjacent osteomyelitis. Consider correlation with MR imaging.      IMPRESSION:     Plantar foot ulcer. No radiographic evidence of osteomyelitis.    Additional findings as above.                  SYDNEY LARA M.D., ATTENDING RADIOLOGIST  This document has been electronically signed. Jan 6 2020  3:28PM      -------------------------------------------------------------------------  < from: CT Foot No Cont, Left (01.09.20 @ 08:57) >     EXAM:  CT ANKLE ONLY LT                         EXAM:  CT FOOT ONLY LT                          PROCEDURE DATE:  01/09/2020          INTERPRETATION:  HISTORY: Diabetic foot ulcer with fever. Concern for underlying osteomyelitis.    Helical CT imaging of the left foot and ankle was performed without intravenous contrast. Sagittal and coronal reformats were provided.     Correlation is made with radiographs from January 6, 2020.    FINDINGS:    Patient is status post amputation of the first toe at the level of the head of the first metatarsal. There is a broad-based plantar wound at the level of the midfoot measuring approximately 3.7 cm in anteroposterior posterior dimension and 4.1 cm in transverse dimension. There is redemonstration of severe Charcot neuropathic arthropathy involving the midfoot with destruction and disorganization of the tarsometatarsal, naviculocuneiform, and calcaneocuboid joints. There is associated dorsal subluxation of the metatarsals and a acquired rocker-bottom deformity. Areas of osseous bridging about the neuropathic midfoot are noted. Areas of nonspecific osseous erosion and productive change are seen along the plantar aspect of the fused mid foot subjacent to the plantar ulceration. Additional areas ofosseous erosion are also seen along the plantar aspect of the calcaneus posteriorly and about the second metatarsal phalangeal joint. Findings may be on the basis of osteomyelitis or degenerative change. Further evaluation with a technetium sulfur colloid marrow scan and leukocyte labeled white blood cell study is recommended to differentiate. There is mild osseous productive change about the distal fibula which is likely on the basis of osseous stress reaction.    There is no subcutaneous air. There is confluent isoattenuation to low attenuation within the plantar subcutaneous fat about the plantar wound likely related to comminution granulation tissue and/or cellulitis. Circumferential subcutaneous edema and skin thickening is seen about the lower leg, also suggestive of cellulitis.    There is diffuse fatty atrophy of the visualized musculature. There is atherosclerotic disease.    IMPRESSION:    Severe neuropathic osteoarthropathy of the midfoot with areas of osseous bridging and acquired rocker-bottom deformity. Large plantar cutaneous wound at the level of the midfoot. There are nonspecific areas of osseous erosion along the plantar aspect of the midfoot subjacent to the wound, along the plantar posterior aspect of the calcaneus, and about the second metatarsal phalangeal joint. Findings may be related to underlying osteomyelitis versus degenerative change. Further evaluation with technetium sulfur coli marrow scan and leukocyte labeled white blood cell study is recommended to differentiate.    No subcutaneous air. Cellulitis about the visualized foot and ankle, most notably about the plantar wound.        ROBINSON REAGAN M.D., ATTENDING RADIOLOGIST  This document has been electronically signed. Jan 9 2020  9:26AM    < end of copied text >        A: Left foot plantar midfoot ulceration secondary to midfoot charcot breakdown  BL charcot feet      P:   Chart reviewed and Patient evaluated  Discussed diagnosis and treatment with patient  Wound flush with normal saline  culture pending  Applied packing with betadine with dry sterile dressing  X-rays reviewed, as above  MRI pending  CT scan of L LE taken, reading above  Continue with IV antibiotics As Per ID  Ordered RICHARD  Non-weight bearing to left foot  Surgical planning for left foot I&D tomorrow  Please medically clear patient for surgery  NPO tonight midnight  Offloading to bilateral Heels.   Discussed importance of daily foot examinations and proper shoe gear and to importance of lower Fasting Blood Glucose levels.   Podiatry will follow while in house.  Discussed with Attending Dr Peterson & tami S : 74y year old Male seen at bedside for Left plantar foot ulceration. Pt states he is visiting from [place] where he sees a podiatrist who has been applying alginate to the foot. Pt states he was here over the weekend, when he developed fevers and chills on Monday AM 1/6. Pt came to the hospital then.     Chief Complaint : Patient is a 74y old  Male who presents with a chief complaint of Septic shock (08 Jan 2020 09:18)    HPI : HPI:  73 y/o Male who presented with c/o Lethargy feeling generalized weakness fever and chills that started today and unaware of draining left foot ulcer with erythema had fever, elevated WBC, tachycardia, hypotension needed IVF-> MICU called for eval.   At baseline, patient's BP runs in 110's and takes enalapril 10 at home.   Has been "under the weather for couple of days and denied any other complaints   Since ICU saw the patient, had worsening of BP and needed to be started in IV Levophed and hence admitted to MICU. (06 Jan 2020 20:35)      Patient admits to  (+) Fevers, (+) Chills, (-) Nausea, (-) Vomiting, (-) Shortness of Breath      PMH: HLD (hyperlipidemia)  DM (diabetes mellitus)    PSH:Charcot foot due to diabetes mellitus      Allergies:No Known Allergies      Labs:  Vital Signs Last 24 Hrs  T(C): 36.6 (09 Jan 2020 08:13), Max: 39.4 (08 Jan 2020 19:30)  T(F): 97.8 (09 Jan 2020 08:13), Max: 103 (08 Jan 2020 19:30)  HR: 90 (09 Jan 2020 08:19) (75 - 125)  BP: 127/74 (09 Jan 2020 08:13) (117/51 - 138/84)  BP(mean): --  RR: 18 (09 Jan 2020 08:13) (18 - 23)  SpO2: 94% (09 Jan 2020 08:13) (92% - 97%)                          11.2   8.12  )-----------( 260      ( 08 Jan 2020 08:32 )             32.7     01-08    132<L>  |  99  |  14.0  ----------------------------<  102  4.4   |  20.0<L>  |  0.84    Ca    8.1<L>      08 Jan 2020 08:32  Mg     2.2     01-08      O:   General: Pleasant  male NAD & AOX3.    Integument:  Skin warm, dry and supple bilateral.    Ulceration Left plantar midfoot: + hyperkeratotic border, wound base Granular, wound size (3 cm X 4cm X 3cm) + edema, + cindy-wound erythema, + purulence, -fluctuance, - tracking/tunneling, +probe to bone.   Vascular: Dorsalis Pedis and Posterior Tibial pulses 1/4.  Capillary re-fill time less then 3 seconds digits 1-5 bilateral.    Neuro: Protective sensation grossly diminished to the level of the digits bilateral.  Deformity: BL Charcot deformity with midfoot break    ----------------------------------------------------------------------  < from: Xray Foot AP + Lateral + Oblique, Left (01.06.20 @ 15:06) >     EXAM:  FOOT-LEFT                          PROCEDURE DATE:  01/06/2020          INTERPRETATION:  EXAM:XR FOOT LEFT.     CLINICAL INDICATION: diabetic foot ulcer, fever, eval osteomyelitis .     TECHNIQUE: 3 views.     PRIOR EXAM: None.     FINDINGS:      Prior resection of the great toe at the level of mid first metatarsal. 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 Bony fusion across the proximal first through fifth metatarsals and tarsals. Reversal of the plantar arch.    Diffuse soft tissue swelling. Plantar foot ulcer. No radiographic evidence of adjacent osteomyelitis. Consider correlation with MR imaging.      IMPRESSION:     Plantar foot ulcer. No radiographic evidence of osteomyelitis.    Additional findings as above.                  SYDNEY LARA M.D., ATTENDING RADIOLOGIST  This document has been electronically signed. Jan 6 2020  3:28PM      -------------------------------------------------------------------------  < from: CT Foot No Cont, Left (01.09.20 @ 08:57) >     EXAM:  CT ANKLE ONLY LT                         EXAM:  CT FOOT ONLY LT                          PROCEDURE DATE:  01/09/2020          INTERPRETATION:  HISTORY: Diabetic foot ulcer with fever. Concern for underlying osteomyelitis.    Helical CT imaging of the left foot and ankle was performed without intravenous contrast. Sagittal and coronal reformats were provided.     Correlation is made with radiographs from January 6, 2020.    FINDINGS:    Patient is status post amputation of the first toe at the level of the head of the first metatarsal. There is a broad-based plantar wound at the level of the midfoot measuring approximately 3.7 cm in anteroposterior posterior dimension and 4.1 cm in transverse dimension. There is redemonstration of severe Charcot neuropathic arthropathy involving the midfoot with destruction and disorganization of the tarsometatarsal, naviculocuneiform, and calcaneocuboid joints. There is associated dorsal subluxation of the metatarsals and a acquired rocker-bottom deformity. Areas of osseous bridging about the neuropathic midfoot are noted. Areas of nonspecific osseous erosion and productive change are seen along the plantar aspect of the fused mid foot subjacent to the plantar ulceration. Additional areas ofosseous erosion are also seen along the plantar aspect of the calcaneus posteriorly and about the second metatarsal phalangeal joint. Findings may be on the basis of osteomyelitis or degenerative change. Further evaluation with a technetium sulfur colloid marrow scan and leukocyte labeled white blood cell study is recommended to differentiate. There is mild osseous productive change about the distal fibula which is likely on the basis of osseous stress reaction.    There is no subcutaneous air. There is confluent isoattenuation to low attenuation within the plantar subcutaneous fat about the plantar wound likely related to comminution granulation tissue and/or cellulitis. Circumferential subcutaneous edema and skin thickening is seen about the lower leg, also suggestive of cellulitis.    There is diffuse fatty atrophy of the visualized musculature. There is atherosclerotic disease.    IMPRESSION:    Severe neuropathic osteoarthropathy of the midfoot with areas of osseous bridging and acquired rocker-bottom deformity. Large plantar cutaneous wound at the level of the midfoot. There are nonspecific areas of osseous erosion along the plantar aspect of the midfoot subjacent to the wound, along the plantar posterior aspect of the calcaneus, and about the second metatarsal phalangeal joint. Findings may be related to underlying osteomyelitis versus degenerative change. Further evaluation with technetium sulfur coli marrow scan and leukocyte labeled white blood cell study is recommended to differentiate.    No subcutaneous air. Cellulitis about the visualized foot and ankle, most notably about the plantar wound.        ROBINSON REAGAN M.D., ATTENDING RADIOLOGIST  This document has been electronically signed. Jan 9 2020  9:26AM    < end of copied text >        A: Left foot plantar midfoot ulceration secondary to midfoot charcot breakdown  BL charcot feet      P:   Chart reviewed and Patient evaluated  Discussed diagnosis and treatment with patient  Wound flush with normal saline  culture pending  Applied packing with betadine with dry sterile dressing  X-rays reviewed, as above  MRI pending  CT scan of L LE taken, reading above  Continue with IV antibiotics As Per ID  Ordered RICHARD  Non-weight bearing to left foot  Once medically stable, and blood cultures negative, patient may be discharged and f/u with Podiatrist and PMD in [place] for further treatment.  Offloading to bilateral Heels.   Discussed importance of daily foot examinations and proper shoe gear and to importance of lower Fasting Blood Glucose levels.   Podiatry will follow while in house.  Discussed with Attending Dr Peterson & tami

## 2020-01-09 NOTE — PROGRESS NOTE ADULT - ASSESSMENT
75 y/o Male who presented with c/o Lethargy feeling generalized weakness fever and chills that started today and unaware of draining left foot ulcer with erythema had fever, elevated WBC, tachycardia, hypotension needed IVF-> MICU called for eval.   At baseline, patient's BP runs in 110's and takes enalapril 10 at home.   Has been "under the weather for couple of days and denied any other complaints   Since ICU saw the patient, had worsening of BP and needed to be started in IV Levophed and hence admitted to MICU. (2020 20:35)    He was up here for a  before getting sick,.. Prior coming in, he had shivers and chills at hotel.  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  Has a wound in his left foot for 2 years, being cared for by local podiatrist in [place].  Had a prior hx of long term IV ABX for foot infection in the past, going to local clinic for daily IV antibiotics - it was the least expensive route per patient.     Cultures from blood - aerobic x 2 sets with gram negative rods, identification not picked up on PCR.   wound cx sent x 2 from bedside,.   repeat Blood cx ordered 1/7/19 x 2 sets,       Denies antibiotics allergies.   Impression:  diabetic foot ulcer  local cellulitis  possible Osteomyelitis  gram negative bacteremia  Left charcot foot   diabetic neuropathy  febrile illness      Plan:  - concern for osteomyelitis of foot given that ulcer present for 2 years.  ESR is elevated  PENDING MRI  - foot wound cx sent; Gram NEGATIVES IN GRAM STAIN    regarding bacteremia with PROTEUS  - repeat blood cx x 2 sets SEND ON 2020  - continue zosyn FOR NOW    DM2  - appears to be in fair control   Hemoglobin A1C, Whole Blood: 7.7 % (20 @ 06:58)  - continue DM management      IF BLOOD CX FROM 2020 ARE NEGATIVE, AND FOOT CULTURES WITH PROTEUS, AND no resistant ORGANISMS,    CAN BE discharged to home with PO LEVAQUIN  x 14 days.   WOULD NEED TO FOLLOW WITH PMD AND PODIATRIST  in [place] for follow up.

## 2020-01-10 LAB
ANION GAP SERPL CALC-SCNC: 11 MMOL/L — SIGNIFICANT CHANGE UP (ref 5–17)
BUN SERPL-MCNC: 13 MG/DL — SIGNIFICANT CHANGE UP (ref 8–20)
CALCIUM SERPL-MCNC: 8.6 MG/DL — SIGNIFICANT CHANGE UP (ref 8.6–10.2)
CHLORIDE SERPL-SCNC: 102 MMOL/L — SIGNIFICANT CHANGE UP (ref 98–107)
CO2 SERPL-SCNC: 22 MMOL/L — SIGNIFICANT CHANGE UP (ref 22–29)
CREAT SERPL-MCNC: 0.71 MG/DL — SIGNIFICANT CHANGE UP (ref 0.5–1.3)
GLUCOSE BLDC GLUCOMTR-MCNC: 186 MG/DL — HIGH (ref 70–99)
GLUCOSE BLDC GLUCOMTR-MCNC: 253 MG/DL — HIGH (ref 70–99)
GLUCOSE BLDC GLUCOMTR-MCNC: 256 MG/DL — HIGH (ref 70–99)
GLUCOSE BLDC GLUCOMTR-MCNC: 279 MG/DL — HIGH (ref 70–99)
GLUCOSE SERPL-MCNC: 196 MG/DL — HIGH (ref 70–115)
HCT VFR BLD CALC: 36.3 % — LOW (ref 39–50)
HGB BLD-MCNC: 11.5 G/DL — LOW (ref 13–17)
MCHC RBC-ENTMCNC: 28.2 PG — SIGNIFICANT CHANGE UP (ref 27–34)
MCHC RBC-ENTMCNC: 31.7 GM/DL — LOW (ref 32–36)
MCV RBC AUTO: 89 FL — SIGNIFICANT CHANGE UP (ref 80–100)
PLATELET # BLD AUTO: 291 K/UL — SIGNIFICANT CHANGE UP (ref 150–400)
POTASSIUM SERPL-MCNC: 4.2 MMOL/L — SIGNIFICANT CHANGE UP (ref 3.5–5.3)
POTASSIUM SERPL-SCNC: 4.2 MMOL/L — SIGNIFICANT CHANGE UP (ref 3.5–5.3)
RBC # BLD: 4.08 M/UL — LOW (ref 4.2–5.8)
RBC # FLD: 14.6 % — HIGH (ref 10.3–14.5)
SODIUM SERPL-SCNC: 135 MMOL/L — SIGNIFICANT CHANGE UP (ref 135–145)
WBC # BLD: 8.24 K/UL — SIGNIFICANT CHANGE UP (ref 3.8–10.5)
WBC # FLD AUTO: 8.24 K/UL — SIGNIFICANT CHANGE UP (ref 3.8–10.5)

## 2020-01-10 PROCEDURE — 99232 SBSQ HOSP IP/OBS MODERATE 35: CPT

## 2020-01-10 PROCEDURE — 99222 1ST HOSP IP/OBS MODERATE 55: CPT

## 2020-01-10 PROCEDURE — 93010 ELECTROCARDIOGRAM REPORT: CPT

## 2020-01-10 PROCEDURE — 99233 SBSQ HOSP IP/OBS HIGH 50: CPT

## 2020-01-10 RX ORDER — MIDODRINE HYDROCHLORIDE 2.5 MG/1
5 TABLET ORAL EVERY 8 HOURS
Refills: 0 | Status: DISCONTINUED | OUTPATIENT
Start: 2020-01-10 | End: 2020-01-11

## 2020-01-10 RX ORDER — METOPROLOL TARTRATE 50 MG
25 TABLET ORAL DAILY
Refills: 0 | Status: DISCONTINUED | OUTPATIENT
Start: 2020-01-10 | End: 2020-01-11

## 2020-01-10 RX ADMIN — LEVALBUTEROL 0.63 MILLIGRAM(S): 1.25 SOLUTION, CONCENTRATE RESPIRATORY (INHALATION) at 08:31

## 2020-01-10 RX ADMIN — INSULIN GLARGINE 40 UNIT(S): 100 INJECTION, SOLUTION SUBCUTANEOUS at 22:03

## 2020-01-10 RX ADMIN — Medication 6: at 12:26

## 2020-01-10 RX ADMIN — Medication 6: at 17:25

## 2020-01-10 RX ADMIN — Medication 75 MILLIGRAM(S): at 12:26

## 2020-01-10 RX ADMIN — Medication 6: at 22:03

## 2020-01-10 RX ADMIN — Medication 25 MILLIGRAM(S): at 15:18

## 2020-01-10 RX ADMIN — Medication 250 MILLIGRAM(S): at 17:25

## 2020-01-10 RX ADMIN — LEVALBUTEROL 0.63 MILLIGRAM(S): 1.25 SOLUTION, CONCENTRATE RESPIRATORY (INHALATION) at 03:56

## 2020-01-10 RX ADMIN — PIPERACILLIN AND TAZOBACTAM 25 GRAM(S): 4; .5 INJECTION, POWDER, LYOPHILIZED, FOR SOLUTION INTRAVENOUS at 05:46

## 2020-01-10 RX ADMIN — ENOXAPARIN SODIUM 40 MILLIGRAM(S): 100 INJECTION SUBCUTANEOUS at 22:03

## 2020-01-10 RX ADMIN — ATORVASTATIN CALCIUM 20 MILLIGRAM(S): 80 TABLET, FILM COATED ORAL at 22:03

## 2020-01-10 NOTE — PROGRESS NOTE ADULT - SUBJECTIVE AND OBJECTIVE BOX
CC: Septic shock (07 Jan 2020 16:32)    INTERVAL HPI/OVERNIGHT EVENTS:  Wife at bedside, no fevers overnight, No acute complaints.  Overnight intermittent episodes of SVT    ROS: denies h/a, dizziness, chest pain, palpitations, cough, dyspnea, N/V/D, abd pain.     PHYSICAL EXAM:    Vital Signs Last 24 Hrs  T(C): 36.3 (10 Carlos 2020 08:12), Max: 37.3 (09 Jan 2020 23:00)  T(F): 97.4 (10 Carlos 2020 08:12), Max: 99.2 (09 Jan 2020 23:00)  HR: 88 (10 Carlos 2020 08:46) (59 - 130)  BP: 126/65 (10 Carlos 2020 08:12) (99/63 - 141/83)  BP(mean): --  RR: 18 (10 Carlos 2020 08:12) (18 - 19)  SpO2: 93% (10 Carlos 2020 08:12) (90% - 93%)    GENERAL: Pt sitting up in chair, NAD  ENMT: PERRL, +EOMI.  NECK: soft, Supple, No JVD,   CHEST/LUNG: Clear to auscultation bilaterally; No wheezing.  HEART: S1S2+, Regular rate and rhythm;  +LONI  ABDOMEN: Soft, Nontender, Nondistended; Bowel sounds present.  MUSCULOSKELETAL: Normal range of motion.  SKIN: warm, dry  EXT: Ulceration Left plantar midfoot, BL Charcot deformity  NEURO: AAOX3, no focal deficits, no motor r sensory loss.  PSYCH: normal mood.    MEDICATIONS  (STANDING):  atorvastatin 20 milliGRAM(s) Oral at bedtime  dextrose 5%. 1000 milliLiter(s) (50 mL/Hr) IV Continuous <Continuous>  dextrose 50% Injectable 12.5 Gram(s) IV Push once  dextrose 50% Injectable 25 Gram(s) IV Push once  dextrose 50% Injectable 25 Gram(s) IV Push once  enoxaparin Injectable 40 milliGRAM(s) SubCutaneous every 12 hours  insulin glargine Injectable (LANTUS) 40 Unit(s) SubCutaneous at bedtime  insulin lispro (HumaLOG) corrective regimen sliding scale   SubCutaneous Before meals and at bedtime  midodrine 5 milliGRAM(s) Oral every 8 hours  piperacillin/tazobactam IVPB.. 3.375 Gram(s) IV Intermittent every 8 hours  saccharomyces boulardii 250 milliGRAM(s) Oral two times a day  venlafaxine XR. 75 milliGRAM(s) Oral daily    MEDICATIONS  (PRN):  acetaminophen   Tablet .. 650 milliGRAM(s) Oral every 6 hours PRN Temp greater or equal to 38C (100.4F), Mild Pain (1 - 3)  dextrose 40% Gel 15 Gram(s) Oral once PRN Blood Glucose LESS THAN 70 milliGRAM(s)/deciliter  glucagon  Injectable 1 milliGRAM(s) IntraMuscular once PRN Glucose LESS THAN 70 milligrams/deciliter  morphine  - Injectable 2 milliGRAM(s) IV Push every 4 hours PRN Moderate Pain (4 - 6)  morphine  - Injectable 2 milliGRAM(s) IV Push every 2 hours PRN Severe Pain (7 - 10)    LABS:                        11.5   8.24  )-----------( 291      ( 10 Carlos 2020 06:36 )             36.3     01-10    135  |  102  |  13.0  ----------------------------<  196<H>  4.2   |  22.0  |  0.71    Ca    8.6      10 Carlos 2020 06:36    < from: MR Foot No Cont, Left (01.09.20 @ 10:49) >  IMPRESSION:    1.  Skin defect and soft tissue edema at the midfoot and hindfoot concerning for cellulitis. Possible skin wound at the distal medial forefoot. No drainable fluid collection.  2.  Abnormal marrow signal in the plantar calcaneus and within the midfoot concerning for osteomyelitis given the overlying skin wound.  3.  Osteomyelitis of the second metatarsal head and second proximal phalanx. Suspected overlying skin wound.  4.  Chronic severe Charcot arthropathy  5.  Suspected full-thickness tearing of the flexor tendons to the third through fifth digits at the level of the midfoot skin ulceration.    < end of copied text >    Culture - Surgical Swab (01.08.20 @ 15:35)    -  Tobramycin: S <=4    -  Tobramycin: S <=4    -  Trimethoprim/Sulfamethoxazole: S <=2/38    -  Trimethoprim/Sulfamethoxazole: S <=2/38    -  Vancomycin: S 2    -  Gentamicin: S <=4    -  Gentamicin: S <=4    -  Imipenem: S <=1    -  Levofloxacin: S <=2    -  Levofloxacin: S <=2    -  Levofloxacin: S <=1    -  Meropenem: S <=1    -  Meropenem: S <=1    -  Piperacillin/Tazobactam: S <=16    -  Piperacillin/Tazobactam: S <=16    -  Tetra/Doxy: R >8    Gram Stain:   Few White blood cells  Few Gram Negative Rods    -  Amikacin: S <=16    -  Amikacin: S <=16    -  Ampicillin: S <=2 Predicts results to ampicillin/sulbactam, amoxacillin-clavulanate and  piperacillin-tazobactam.    -  Ampicillin: R <=8 These ampicillin results predict results for amoxicillin    -  Ampicillin: S <=8 These ampicillin results predict results for amoxicillin    -  Ampicillin/Sulbactam: R <=8/4 Enterobacter, Citrobacter, and Serratia may develop resistance during prolonged therapy (3-4 days)    -  Ampicillin/Sulbactam: S <=8/4 Enterobacter, Citrobacter, and Serratia may develop resistance during prolonged therapy (3-4 days)    -  Aztreonam: S <=4    -  Aztreonam: S <=4    -  Cefazolin: R 16 Enterobacter, Citrobacter, and Serratia may develop resistance during prolonged therapy (3-4 days)    -  Cefazolin: S <=8 Enterobacter, Citrobacter, and Serratia may develop resistance during prolonged therapy (3-4 days)    -  Cefepime: S <=4    -  Cefepime: S <=4    -  Cefoxitin: R >16    -  Cefoxitin: S <=8    -  Ceftriaxone: S <=1 Enterobacter, Citrobacter, and Serratia may develop resistance during prolonged therapy    -  Ceftriaxone: S <=1 Enterobacter, Citrobacter, and Serratia may develop resistance during prolonged therapy    -  Ciprofloxacin: S <=1    -  Ciprofloxacin: S <=1    -  Ertapenem: S <=1    -  Ertapenem: S <=1    Specimen Source: .Surgical Swab left foot    Culture Results:   Few Proteus mirabilis  Few Citrobacter braakii  Few Enterococcus faecalis  Few Corynebacterium species  Culture in progress    Organism Identification: Proteus mirabilis  Citrobacter braakii  Enterococcus faecalis    Organism: Proteus mirabilis    Organism: Enterococcus faecalis    Organism: Citrobacter braakii    Method Type: ARIEL    Method Type: ARIEL    Method Type: ARIEL

## 2020-01-10 NOTE — PROGRESS NOTE ADULT - ASSESSMENT
75 y/o Male who presented with c/o Lethargy feeling generalized weakness fever and chills that started today and unaware of draining left foot ulcer with erythema had fever, elevated WBC, tachycardia, hypotension needed IVF-> MICU called for eval.   At baseline, patient's BP runs in 110's and takes enalapril 10 at home.   Has been "under the weather for couple of days and denied any other complaints   Since ICU saw the patient, had worsening of BP and needed to be started in IV Levophed and hence admitted to MICU. (2020 20:35)    He was up here for a  before getting sick,.. Prior coming in, he had shivers and chills at hotel.  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  Has a wound in his left foot for 2 years, being cared for by local podiatrist in [place].  Had a prior hx of long term IV ABX for foot infection in the past, going to local clinic for daily IV antibiotics - it was the least expensive route per patient.     Cultures from blood - aerobic x 2 sets with gram negative rods, identification not picked up on PCR.   wound cx sent x 2 from bedside,.   repeat Blood cx ordered 1/7/19 x 2 sets,       Denies antibiotics allergies.   Impression:  diabetic foot ulcer  local cellulitis  possible Osteomyelitis  Polymicrobial infection  Left charcot foot   diabetic neuropathy  febrile illness      Plan:  - concern for osteomyelitis of foot given that ulcer present for 2 years.  ESR is elevated  s/p MRI  polymicrobial infection    regarding bacteremia with PROTEUS  - repeat blood cx x 2 sets SEND ON 2020; were negative  FOOT wound with Proteus, Citrobacter, and Enterococcus - overall Susceptible to Levaquin  - will change to LEVAQUIN 750mg PO daily x 10 days.   - this will treat the bacteremia.    PATIENT IS INFORMED THAT HE NEEDS TO FOLLOW UP WITH HIS PHYSICIAN IN [place], arrange to IV ANTIBIOTICS AT HOME,   Suggest to use Zosyn 3.375 grams Q8H prolong infusion.  x 4 weeks for osteomyelitis.       DM2  - appears to be in fair control   Hemoglobin A1C, Whole Blood: 7.7 % (20 @ 06:58)  - continue DM management    no contraindications to discharge to community from ID standpoint

## 2020-01-10 NOTE — PROGRESS NOTE ADULT - SUBJECTIVE AND OBJECTIVE BOX
S : 74y year old Male seen at bedside for Left plantar foot ulceration. Pt states he is visiting from [place] where he sees a podiatrist who has been applying alginate to the foot. Pt states he was here over the weekend, when he developed fevers and chills on Monday AM 1/6. Pt came to the hospital then.     Chief Complaint : Patient is a 74y old  Male who presents with a chief complaint of Septic shock (08 Jan 2020 09:18)    HPI : HPI:  75 y/o Male who presented with c/o Lethargy feeling generalized weakness fever and chills that started today and unaware of draining left foot ulcer with erythema had fever, elevated WBC, tachycardia, hypotension needed IVF-> MICU called for eval.   At baseline, patient's BP runs in 110's and takes enalapril 10 at home.   Has been "under the weather for couple of days and denied any other complaints   Since ICU saw the patient, had worsening of BP and needed to be started in IV Levophed and hence admitted to MICU. (06 Jan 2020 20:35)      Patient admits to  (+) Fevers, (+) Chills, (-) Nausea, (-) Vomiting, (-) Shortness of Breath      PMH: HLD (hyperlipidemia)  DM (diabetes mellitus)    PSH:Charcot foot due to diabetes mellitus      Allergies:No Known Allergies      Labs:  Vital Signs Last 24 Hrs  T(C): 36.6 (10 Carlos 2020 15:38), Max: 37.3 (09 Jan 2020 23:00)  T(F): 97.8 (10 Carlos 2020 15:38), Max: 99.2 (09 Jan 2020 23:00)  HR: 88 (10 Carlos 2020 15:38) (59 - 110)  BP: 145/72 (10 Carlos 2020 15:38) (99/63 - 145/72)  BP(mean): --  RR: 19 (10 Carlos 2020 15:38) (18 - 19)  SpO2: 94% (10 Carlos 2020 15:38) (90% - 94%)                            11.5   8.24  )-----------( 291      ( 10 Carlos 2020 06:36 )             36.3     01-10    135  |  102  |  13.0  ----------------------------<  196<H>  4.2   |  22.0  |  0.71    Ca    8.6      10 Carlos 2020 06:36        O:   General: Pleasant  male NAD & AOX3.    Integument:  Skin warm, dry and supple bilateral.    Ulceration Left plantar midfoot: + hyperkeratotic border, wound base Granular, wound size (3 cm X 4cm X 3cm) + edema, + cindy-wound erythema, + purulence, -fluctuance, - tracking/tunneling, +probe to bone.   Vascular: Dorsalis Pedis and Posterior Tibial pulses 1/4.  Capillary re-fill time less then 3 seconds digits 1-5 bilateral.    Neuro: Protective sensation grossly diminished to the level of the digits bilateral.  Deformity: BL Charcot deformity with midfoot break    ----------------------------------------------------------------------      < from: Xray Foot AP + Lateral + Oblique, Left (01.06.20 @ 15:06) >     EXAM:  FOOT-LEFT                          PROCEDURE DATE:  01/06/2020          INTERPRETATION:  EXAM:XR FOOT LEFT.     CLINICAL INDICATION: diabetic foot ulcer, fever, eval osteomyelitis .     TECHNIQUE: 3 views.     PRIOR EXAM: None.     FINDINGS:      Prior resection of the great toe at the level of mid first metatarsal. 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 Bony fusion across the proximal first through fifth metatarsals and tarsals. Reversal of the plantar arch.    Diffuse soft tissue swelling. Plantar foot ulcer. No radiographic evidence of adjacent osteomyelitis. Consider correlation with MR imaging.      IMPRESSION:     Plantar foot ulcer. No radiographic evidence of osteomyelitis.    Additional findings as above.                  SYDNEY LARA M.D., ATTENDING RADIOLOGIST  This document has been electronically signed. Jan 6 2020  3:28PM      -------------------------------------------------------------------------  < from: CT Foot No Cont, Left (01.09.20 @ 08:57) >     EXAM:  CT ANKLE ONLY LT                         EXAM:  CT FOOT ONLY LT                          PROCEDURE DATE:  01/09/2020          INTERPRETATION:  HISTORY: Diabetic foot ulcer with fever. Concern for underlying osteomyelitis.    Helical CT imaging of the left foot and ankle was performed without intravenous contrast. Sagittal and coronal reformats were provided.     Correlation is made with radiographs from January 6, 2020.    FINDINGS:    Patient is status post amputation of the first toe at the level of the head of the first metatarsal. There is a broad-based plantar wound at the level of the midfoot measuring approximately 3.7 cm in anteroposterior posterior dimension and 4.1 cm in transverse dimension. There is redemonstration of severe Charcot neuropathic arthropathy involving the midfoot with destruction and disorganization of the tarsometatarsal, naviculocuneiform, and calcaneocuboid joints. There is associated dorsal subluxation of the metatarsals and a acquired rocker-bottom deformity. Areas of osseous bridging about the neuropathic midfoot are noted. Areas of nonspecific osseous erosion and productive change are seen along the plantar aspect of the fused mid foot subjacent to the plantar ulceration. Additional areas ofosseous erosion are also seen along the plantar aspect of the calcaneus posteriorly and about the second metatarsal phalangeal joint. Findings may be on the basis of osteomyelitis or degenerative change. Further evaluation with a technetium sulfur colloid marrow scan and leukocyte labeled white blood cell study is recommended to differentiate. There is mild osseous productive change about the distal fibula which is likely on the basis of osseous stress reaction.    There is no subcutaneous air. There is confluent isoattenuation to low attenuation within the plantar subcutaneous fat about the plantar wound likely related to comminution granulation tissue and/or cellulitis. Circumferential subcutaneous edema and skin thickening is seen about the lower leg, also suggestive of cellulitis.    There is diffuse fatty atrophy of the visualized musculature. There is atherosclerotic disease.    IMPRESSION:    Severe neuropathic osteoarthropathy of the midfoot with areas of osseous bridging and acquired rocker-bottom deformity. Large plantar cutaneous wound at the level of the midfoot. There are nonspecific areas of osseous erosion along the plantar aspect of the midfoot subjacent to the wound, along the plantar posterior aspect of the calcaneus, and about the second metatarsal phalangeal joint. Findings may be related to underlying osteomyelitis versus degenerative change. Further evaluation with technetium sulfur coli marrow scan and leukocyte labeled white blood cell study is recommended to differentiate.    No subcutaneous air. Cellulitis about the visualized foot and ankle, most notably about the plantar wound.        ROBINSON REAGAN M.D., ATTENDING RADIOLOGIST  This document has been electronically signed. Jan 9 2020  9:26AM    < end of copied text >        A: Left foot plantar midfoot ulceration secondary to midfoot charcot breakdown  BL charcot feet      P:   Chart reviewed and Patient evaluated  Discussed diagnosis and treatment with patient  Wound flush with normal saline  culture pending  Applied packing with betadine with dry sterile dressing  X-rays reviewed, as above  CT scan of L LE taken, reading above  Continue with IV antibiotics As Per ID  Ordered RICHARD  Non-weight bearing to left foot  Once medically stable, and blood cultures negative, patient may be discharged and f/u with Podiatrist and PMD in [place] for further treatment.  Offloading to bilateral Heels.   Discussed importance of daily foot examinations and proper shoe gear and to importance of lower Fasting Blood Glucose levels.   Podiatry will follow while in house.  Discussed with Attending Dr Peterson & tami    Given wound care instructions:  1. cleanse wound with betadine  2. apply packing to wound medially  3. apply betadine to 4x4 gauze,  4. apply clean 4x4 gauze, kerlix and light ace  repeat daily

## 2020-01-10 NOTE — PROGRESS NOTE ADULT - ASSESSMENT
73 y/o Male who presented with c/o Lethargy feeling generalized weakness fever and chills that started day of admission and patient was unaware of draining left foot ulcer with erythema. In the ED patient had fever, elevated WBC, tachycardia, hypotension requiring IVF resuscitation. MICU called for evaluation. Despite fluid resuscitation patient required levophed drip and was admitted to the ICU for septic shock from diabetic foot infection.     On 1/6 blood cultures showed GNR. Repeat culture from the 7th pending along with wound culture. Septic shock resolved and patient downgraded on the 1/7. Patient continued on zosyn. Podiatry and infectious disease consulted.    CT performed on 1/9 showed no collection/air, possible osteo, MRI performed 1/9  with abnormal marrow signal in the plantar calcaneus and within the midfoot concerning for osteomyelitis, and osteomyelitis of the second metatarsal head and second proximal phalanx.    Per ID if patient without resistant organisms he will be discharged with outpatient follow up with his care providers in [place] where he is from. He will go on levaquin x 14 days PO to bridge him to those appointments.    #Severe sepsis/septic shock: resolved  - 2/2 diabetic foot ulcer  - continue zosyn IV, anticipate change to levaquin on d/c  - Last BCx2  1/7/20 no growth    #hypotension - improving  - 126/65 (10 Carlos 2020 08:12) (99/63 - 141/83)  - remains on midodrine Q8  - parameters added to hold for systolic >110  - resume anti-HTN when feasible (home meds include enalapril, lasix and metoprolol)    # tachycardic  -  home meds include enalapril, lasix and metoprolol- all on hold due to hypotension  - Cardiology consult requested to eval tachycardia for recommendations and clearance for d/c for pt to travel back to [place] to follow up with his own drs  - Is followed by cardiology and reports is supposed to have surgery for AS but this has been deferred because of his foot ulcer.     #Diabetic foot ulcer, suspected OM  - c/w IV ABx as per ID. currently on Zosyn  - wound culture shows Proteus mirabilis, Citrobacter braakii, Enterococcus faecalis, Corynebacterium species  - MRI right foot c/w osteomyelitis  - Echo: no vegetation  - wound care consult  - patient would like to go home with oral antibiotics as he is not from this area and prefers to follow up with his own surgeons and physicians    #acute resp failure with hypoxia: resolved  - leg doppler negative for clot  - incentive spirometer    #DM:  - ISS, Accucheck    #h/o AS  - echo today showed severe AS. Normal EF  - patient is asymptomatic  - follows cardiology in [place]    DVT-P: lovenox  Diet: consistent carb    Dispo: Anticipate d/c home today with PO antibiotics and follow up with providers in [facility] pending clearance by cardiology    D/W pt, wife, Dr Moore

## 2020-01-10 NOTE — CONSULT NOTE ADULT - ASSESSMENT
Pt is a 73 y/o male with medical history of HTN, DM, and AS who presents to [facility]ED for chills and lethargy x 1 day. Pt states he is currently visiting from [place] but all of Monday 1/6/19, he started to experience chills. Pt came to [facility]ED where he was +sepsis with hypotension, febrile, tachycardia and elevated WBC. Pt was treated in MICU, then was downgraded to medical floor. Consult was called for new onset atrial tachycardia. Pt asymptomatic of atrial tachycardia episodes and denies current fever, chills, cough, phlegm production, shortness of breath, dyspnea on exertion, orthopnea, PND, edema, chest pain, pressure, palpitations, irregular, fast heart beat, nausea, vomiting, melena, rectal bleed, hematuria, lightheadedness, dizziness, syncope, near syncope.     Assessment and Plan    1. Atrial Tachycardia  -Tele- SR with brief Atach  nonsustained  - No abnormal electrolytes  - Echo 1/7/2020- EF 55-60%, grade II diastolic dysfunction, severe aortic valve stenosis, normal RV size, mild conc. LVH, severe AS  -Start home dose of BB Metoprolol Succinate 25mg  -f/u with outpatient cardiology Pt is a 75 y/o male with medical history of HTN, DM, and AS who presents to [facility]ED for chills and lethargy x 1 day. Pt states he is currently visiting from [place] but all of Monday 1/6/19, he started to experience chills. Pt came to [facility]ED where he was +sepsis with hypotension, febrile, tachycardia and elevated WBC. Pt was treated in MICU, then was downgraded to medical floor. Consult was called for new onset atrial tachycardia. Pt asymptomatic of atrial tachycardia episodes and denies current fever, chills, cough, phlegm production, shortness of breath, dyspnea on exertion, orthopnea, PND, edema, chest pain, pressure, palpitations, irregular, fast heart beat, nausea, vomiting, melena, rectal bleed, hematuria, lightheadedness, dizziness, syncope, near syncope.    Patient has salvos of AT at up to 130-150 bpm. Recommend starting beta blockade (home dose of Metoprolol 25mg daily) to help suppress atrial arrhythmias, which may be more frequent in setting of infection. If patient tolerates his home dose in conjunction with Midodrine, then would be OK with discharge. He is eager to be discharged but reports that he will follow up promptly with his cardiologist once discharged. He should also get outpatient monitoring to assess for AF given his multiple risk factors for stroke and AF.    Assessment and Plan    1. Atrial Tachycardia  -Tele- SR with brief Atach  nonsustained  - No abnormal electrolytes  - Echo 1/7/2020- EF 55-60%, grade II diastolic dysfunction, severe aortic valve stenosis, normal RV size, mild conc. LVH, severe AS  -Start home dose of BB Metoprolol Succinate 25mg  -f/u with outpatient cardiology     Thank you for this consultation. EP will sign off. Please contact EP team with any questions.    Mario Cintron MD  Clinical Cardiac Electrophysiology

## 2020-01-10 NOTE — PROGRESS NOTE ADULT - SUBJECTIVE AND OBJECTIVE BOX
[facility]  INFECTIOUS DISEASES AND INTERNAL MEDICINE at [place]  =======================================================  Hipolito Clark MD  Diplomates American Board of Internal Medicine and Infectious Diseases  Telephone [phone]  Fax            [phone]  =======================================================    [facility]-[id number]  JOAN OLIVERA   follow up: foot infection, proteus bacteremia     MRI confirmed with osteomyelitis.   polymicrobial foot infection.       =======================================================   REVIEW OF SYSTEMS:  CONSTITUTIONAL:  POSITIVE Fevers AND chills  HEENT:  No diplopia or blurred vision.  No earache, sore throat or runny nose.  CARDIOVASCULAR:  No pressure, squeezing, strangling, tightness, heaviness or aching about the chest, neck, axilla or epigastrium.  RESPIRATORY:  No cough, shortness of breath  GASTROINTESTINAL:  No nausea, vomiting or diarrhea.  GENITOURINARY:  No dysuria, frequency or urgency. No Blood in urine  MUSCULOSKELETAL:  no joint aches, no muscle pain  SKIN:  No change in skin, hair or nails.  NEUROLOGIC:  No Headaches, seizures or weakness.  PSYCHIATRIC:  No disorder of thought or mood.  ENDOCRINE:  No heat or cold intolerance  HEMATOLOGICAL:  No easy bruising or bleeding.   =======================================================  Allergies  No Known Allergies    Antibiotics:  piperacillin/tazobactam IVPB.. 3.375 Gram(s) IV Intermittent every 8 hours    Other medications:  atorvastatin 20 milliGRAM(s) Oral at bedtime  dextrose 5%. 1000 milliLiter(s) IV Continuous <Continuous>  dextrose 50% Injectable 12.5 Gram(s) IV Push once  dextrose 50% Injectable 25 Gram(s) IV Push once  dextrose 50% Injectable 25 Gram(s) IV Push once  enoxaparin Injectable 40 milliGRAM(s) SubCutaneous every 12 hours  insulin glargine Injectable (LANTUS) 40 Unit(s) SubCutaneous at bedtime  insulin lispro (HumaLOG) corrective regimen sliding scale   SubCutaneous Before meals and at bedtime  metoprolol succinate ER 25 milliGRAM(s) Oral daily  midodrine 5 milliGRAM(s) Oral every 8 hours  saccharomyces boulardii 250 milliGRAM(s) Oral two times a day  venlafaxine XR. 75 milliGRAM(s) Oral daily    ======================================================  Physical Exam:  ============  T(F): 97.4 (10 Carlos 2020 08:12), Max: 99.2 (09 Jan 2020 23:00)  HR: 88 (10 Carlos 2020 08:46)  BP: 126/65 (10 Carlos 2020 08:12)  RR: 18 (10 Carlos 2020 08:12)  SpO2: 93% (10 Carlos 2020 08:12) (90% - 93%)  temp max in last 48H T(F): , Max: 103 (01-08-20 @ 19:30)    General:  No acute distress.  Eye: Pupils are equal, round and reactive to light, Extraocular movements are intact, Normal conjunctiva.  HENT: Normocephalic, Oral mucosa is moist, No pharyngeal erythema, No sinus tenderness.  Neck: Supple, No lymphadenopathy.  Respiratory: Lungs are clear to auscultation, Respirations are non-labored.  Cardiovascular: Normal rate, Regular rhythm,   Gastrointestinal: Soft, Non-tender, Non-distended, Normal bowel sounds.  Genitourinary: No costovertebral angle tenderness.  Lymphatics: No lymphadenopathy neck,   Musculoskeletal: Normal range of motion, Normal strength.  Integumentary: No rash.    LEFT foot with dressing in place  Neurologic: Alert, Oriented, No focal deficits, Cranial Nerves II-XII are grossly intact.  Psychiatric: Appropriate mood & affect.    =======================================================  Labs:                        11.5   8.24  )-----------( 291      ( 10 Carlos 2020 06:36 )             36.3       WBC Count: 8.24 K/uL (01-10-20 @ 06:36)  WBC Count: 8.12 K/uL (01-08-20 @ 08:32)  WBC Count: 9.87 K/uL (01-07-20 @ 05:27)  WBC Count: 13.51 K/uL (01-06-20 @ 12:10)      01-10    135  |  102  |  13.0  ----------------------------<  196<H>  4.2   |  22.0  |  0.71    Ca    8.6      10 Carlos 2020 06:36        Culture - Urine (collected 01-08-20 @ 15:40)  Source: .Urine  Final Report (01-09-20 @ 16:24):    No growth    Culture - Surgical Swab (collected 01-08-20 @ 15:35)  Source: .Surgical Swab left foot  Gram Stain (01-08-20 @ 16:44):    Few White blood cells    Few Gram Negative Rods  Organism: Proteus mirabilis  Citrobacter braakii  Enterococcus faecalis (01-10-20 @ 11:40)  Organism: Proteus mirabilis (01-10-20 @ 11:40)    Sensitivities:      -  Amikacin: S <=16      -  Ampicillin: S <=8 These ampicillin results predict results for amoxicillin      -  Ampicillin/Sulbactam: S <=8/4 Enterobacter, Citrobacter, and Serratia may develop resistance during prolonged therapy (3-4 days)      -  Aztreonam: S <=4      -  Cefazolin: S <=8 Enterobacter, Citrobacter, and Serratia may develop resistance during prolonged therapy (3-4 days)      -  Cefepime: S <=4      -  Cefoxitin: S <=8      -  Ceftriaxone: S <=1 Enterobacter, Citrobacter, and Serratia may develop resistance during prolonged therapy      -  Ciprofloxacin: S <=1      -  Ertapenem: S <=1      -  Gentamicin: S <=4      -  Levofloxacin: S <=2      -  Meropenem: S <=1      -  Piperacillin/Tazobactam: S <=16      -  Tobramycin: S <=4      -  Trimethoprim/Sulfamethoxazole: S <=2/38      Method Type: ARIEL  Organism: Citrobacter rafitai (01-10-20 @ 11:40)    Sensitivities:      -  Amikacin: S <=16      -  Ampicillin: R <=8 These ampicillin results predict results for amoxicillin      -  Ampicillin/Sulbactam: R <=8/4 Enterobacter, Citrobacter, and Serratia may develop resistance during prolonged therapy (3-4 days)      -  Aztreonam: S <=4      -  Cefazolin: R 16 Enterobacter, Citrobacter, and Serratia may develop resistance during prolonged therapy (3-4 days)      -  Cefepime: S <=4      -  Cefoxitin: R >16      -  Ceftriaxone: S <=1 Enterobacter, Citrobacter, and Serratia may develop resistance during prolonged therapy      -  Ciprofloxacin: S <=1      -  Ertapenem: S <=1      -  Gentamicin: S <=4      -  Imipenem: S <=1      -  Levofloxacin: S <=2      -  Meropenem: S <=1      -  Piperacillin/Tazobactam: S <=16      -  Tobramycin: S <=4      -  Trimethoprim/Sulfamethoxazole: S <=2/38      Method Type: ARIEL  Organism: Enterococcus faecalis (01-10-20 @ 11:40)    Sensitivities:      -  Ampicillin: S <=2 Predicts results to ampicillin/sulbactam, amoxacillin-clavulanate and  piperacillin-tazobactam.      -  Levofloxacin: S <=1      -  Tetra/Doxy: R >8      -  Vancomycin: S 2      Method Type: ARIEL    Culture - Blood (collected 01-07-20 @ 19:48)  Source: .Blood    Culture - Blood (collected 01-07-20 @ 16:15)  Source: .Blood    Culture - Other (collected 01-07-20 @ 12:31)  Source: .Other left foot wound, site #2  Final Report (01-09-20 @ 11:21):    Few Proteus mirabilis    Few Citrobacter braakii    Few Corynebacterium species  Organism: Proteus mirabilis  Citrobacter braakii (01-09-20 @ 11:21)  Organism: Citrobacter braakii (01-09-20 @ 11:21)    Sensitivities:      -  Amikacin: S <=16      -  Ampicillin: R <=8 These ampicillin results predict results for amoxicillin      -  Ampicillin/Sulbactam: R <=8/4 Enterobacter, Citrobacter, and Serratia may develop resistance during prolonged therapy (3-4 days)      -  Aztreonam: S <=4      -  Cefazolin: R >16 Enterobacter, Citrobacter, and Serratia may develop resistance during prolonged therapy (3-4 days)      -  Cefepime: S <=4      -  Cefoxitin: R >16      -  Ceftriaxone: S <=1 Enterobacter, Citrobacter, and Serratia may develop resistance during prolonged therapy      -  Ciprofloxacin: S <=1      -  Ertapenem: S <=1      -  Gentamicin: S <=4      -  Imipenem: S <=1      -  Levofloxacin: S <=2      -  Meropenem: S <=1      -  Piperacillin/Tazobactam: S <=16      -  Tobramycin: S <=4      -  Trimethoprim/Sulfamethoxazole: S <=2/38      Method Type: ARIEL  Organism: Proteus mirabilis (01-09-20 @ 11:21)    Sensitivities:      -  Amikacin: S <=16      -  Ampicillin: S <=8 These ampicillin results predict results for amoxicillin      -  Ampicillin/Sulbactam: S <=8/4 Enterobacter, Citrobacter, and Serratia may develop resistance during prolonged therapy (3-4 days)      -  Aztreonam: S <=4      -  Cefazolin: S <=8 Enterobacter, Citrobacter, and Serratia may develop resistance during prolonged therapy (3-4 days)      -  Cefepime: S <=4      -  Cefoxitin: S <=8      -  Ceftriaxone: S <=1 Enterobacter, Citrobacter, and Serratia may develop resistance during prolonged therapy      -  Ciprofloxacin: S <=1      -  Ertapenem: S <=1      -  Gentamicin: S <=4      -  Levofloxacin: S <=2      -  Meropenem: S <=1      -  Piperacillin/Tazobactam: S <=16      -  Tobramycin: S <=4      -  Trimethoprim/Sulfamethoxazole: S <=2/38      Method Type: ARIEL    Culture - Other (collected 01-07-20 @ 12:30)  Source: .Other left foot wound, site #1  Final Report (01-09-20 @ 11:15):    Few Proteus mirabilis    Few Citrobacter braakii    Few Corynebacterium species  Organism: Proteus mirabilis  Citrobacter braakii (01-09-20 @ 11:15)  Organism: Citrobacter braakii (01-09-20 @ 11:15)    Sensitivities:      -  Amikacin: S <=16      -  Ampicillin: R <=8 These ampicillin results predict results for amoxicillin      -  Ampicillin/Sulbactam: R <=8/4 Enterobacter, Citrobacter, and Serratia may develop resistance during prolonged therapy (3-4 days)      -  Aztreonam: S <=4      -  Cefazolin: R >16 Enterobacter, Citrobacter, and Serratia may develop resistance during prolonged therapy (3-4 days)      -  Cefepime: S <=4      -  Cefoxitin: R >16      -  Ceftriaxone: S <=1 Enterobacter, Citrobacter, and Serratia may develop resistance during prolonged therapy      -  Ciprofloxacin: S <=1      -  Ertapenem: S <=1      -  Gentamicin: S <=4      -  Imipenem: S <=1      -  Levofloxacin: S <=2      -  Meropenem: S <=1      -  Piperacillin/Tazobactam: S <=16      -  Tobramycin: S <=4      -  Trimethoprim/Sulfamethoxazole: S <=2/38      Method Type: ARIEL  Organism: Proteus mirabilis (01-09-20 @ 11:15)    Sensitivities:      -  Amikacin: S <=16      -  Ampicillin: S <=8 These ampicillin results predict results for amoxicillin      -  Ampicillin/Sulbactam: S <=8/4 Enterobacter, Citrobacter, and Serratia may develop resistance during prolonged therapy (3-4 days)      -  Aztreonam: S <=4      -  Cefazolin: S <=8 Enterobacter, Citrobacter, and Serratia may develop resistance during prolonged therapy (3-4 days)      -  Cefepime: S <=4      -  Cefoxitin: S <=8      -  Ceftriaxone: S <=1 Enterobacter, Citrobacter, and Serratia may develop resistance during prolonged therapy      -  Ciprofloxacin: S <=1      -  Ertapenem: S <=1      -  Gentamicin: S <=4      -  Levofloxacin: S <=2      -  Meropenem: S <=1      -  Piperacillin/Tazobactam: S <=16      -  Tobramycin: S <=4      -  Trimethoprim/Sulfamethoxazole: S <=2/38      Method Type: ARIEL    Culture - Urine (collected 01-06-20 @ 21:32)  Source: .Urine  Final Report (01-07-20 @ 17:58):    No growth    Culture - Blood (collected 01-06-20 @ 12:14)  Source: .Blood  Organism: Proteus mirabilis  Blood Culture PCR (01-08-20 @ 09:31)  Organism: Proteus mirabilis (01-08-20 @ 09:31)    Sensitivities:      -  Amikacin: S <=16      -  Ampicillin: S <=8 These ampicillin results predict results for amoxicillin      -  Ampicillin/Sulbactam: S <=8/4 Enterobacter, Citrobacter, and Serratia may develop resistance during prolonged therapy (3-4 days)      -  Aztreonam: S <=4      -  Cefazolin: S <=8 Enterobacter, Citrobacter, and Serratia may develop resistance during prolonged therapy (3-4 days)      -  Cefepime: S <=4      -  Cefoxitin: S <=8      -  Ceftriaxone: S <=1 Enterobacter, Citrobacter, and Serratia may develop resistance during prolonged therapy      -  Ciprofloxacin: S <=1      -  Ertapenem: S <=1      -  Gentamicin: S <=4      -  Levofloxacin: S <=2      -  Meropenem: S <=1      -  Piperacillin/Tazobactam: S <=16      -  Tobramycin: S <=4      -  Trimethoprim/Sulfamethoxazole: S <=2/38      Method Type: ARIEL  Organism: Blood Culture PCR (01-07-20 @ 09:59)    Sensitivities:      -  Acinetobacter baumanii: Nondet      -  Candida albicans: Nondet      -  Candida glabrata: Nondet      -  Candida krusei: Nondet      -  Candida parapsilosis: Nondet      -  Candida tropicalis: Nondet      -  Coagulase negative Staphylococcus: Nondet      -  Enterobacter cloacae complex: Nondet      -  Enterococcus species: Nondet      -  Escherichia coli: Nondet      -  Haemophilus influenzae: Nondet      -  Klebsiella oxytoca: Nondet      -  Klebsiella pneumoniae: Nondet      -  Listeria monocytogenes: Nondet      -  Methicillin resistant Staphylococcus aureus (MRSA): Nondet      -  Multidrug (KPC pos) resistant organism: Nondet      -  Neisseria meningitidis: Nondet      -  Pseudomonas aeruginosa: Nondet      -  Serratia marcescens: Nondet      -  Staphylococcus aureus: Nondet      -  Streptococcus agalactiae (Group B): Nondet      -  Streptococcus pneumoniae: Nondet      -  Streptococcus pyogenes (Group A): Nondet      -  Streptococcus sp. (Not Grp A, B or S pneumoniae): Nondet      -  Vancomycin resistant Enterococcus sp.: Nondet      Method Type: PCR    Culture - Blood (collected 01-06-20 @ 12:13)  Source: .Blood  Organism: Proteus mirabilis (01-08-20 @ 09:36)  Organism: Proteus mirabilis (01-08-20 @ 09:36)    Sensitivities:      -  Amikacin: S <=16      -  Ampicillin: S <=8 These ampicillin results predict results for amoxicillin      -  Ampicillin/Sulbactam: S <=8/4 Enterobacter, Citrobacter, and Serratia may develop resistance during prolonged therapy (3-4 days)      -  Aztreonam: S <=4      -  Cefazolin: S <=8 Enterobacter, Citrobacter, and Serratia may develop resistance during prolonged therapy (3-4 days)      -  Cefepime: S <=4      -  Cefoxitin: S <=8      -  Ceftriaxone: S <=1 Enterobacter, Citrobacter, and Serratia may develop resistance during prolonged therapy      -  Ciprofloxacin: S <=1      -  Ertapenem: S <=1      -  Gentamicin: S <=4      -  Levofloxacin: S <=2      -  Meropenem: S <=1      -  Piperacillin/Tazobactam: S <=16      -  Tobramycin: S <=4      -  Trimethoprim/Sulfamethoxazole: S <=2/38      Method Type: ARIEL

## 2020-01-10 NOTE — PROGRESS NOTE ADULT - ATTENDING COMMENTS
74M w/ PMHx DM, HTN, HLD, Charcot's foot was a/w fever and chills likely sec to an infected L foot ulcer. Off pressors overnight. MRI pending. On empiric Abx and ID consulted. Trend ESR and lactate. If he remains HDS can be transferred out of MICU later today
Dr. Moore
Left foot infection.    Charcot foot.   Atrial tachycardia  Will dc home tomorrow AM on po abx and will follow up with podiatry in [facility]   Will restart beta blocker as bP will tolerate.
Patient was seen bedside with resident.  I reviewed the above assessment and documentation completed by the resident physician.  I verbally discussed the evaluation and treatment plan with resident.  The podiatry team will continue to follow patient while in house.

## 2020-01-10 NOTE — CONSULT NOTE ADULT - SUBJECTIVE AND OBJECTIVE BOX
[facility]                                                               Office:  [address] [place] [address]                                                              Telephone: [phone]. Fax:[phone]                                                                        CARDIOLOGY CONSULTATION NOTE                                                                                             Consult requested by:    Reason for Consultation:   History obtained by: Patient and medical record   obtained: No    Chief complaint:    Patient is a 74y old  Male who presents with a chief complaint of Septic shock (09 Jan 2020 10:45)        HPI: Pt is a 75 y/o male with medical history of HTN, DM, and AS who presents to [facility]ED for chills and lethargy x 1 day. Pt states he is currently visiting from [place] but all of Monday 1/6/19, he started to experience chills. Pt came to [facility]ED where he was +sepsis with hypotension, febrile, tachycardia and elevated WBC. Pt was treated in MICU, then was downgraded to medical floor. Consult was called for new onset atrial tachycardia. Pt asymptomatic of atrial tachycardia episodes and denies current fever, chills, cough, phlegm production, shortness of breath, dyspnea on exertion, orthopnea, PND, edema, chest pain, pressure, palpitations, irregular, fast heart beat, nausea, vomiting, melena, rectal bleed, hematuria, lightheadedness, dizziness, syncope, near syncope.     REVIEW OF SYMPTOMS:     CONSTITUTIONAL: No fever, weight loss, or fatigue  ENMT:  No difficulty hearing, tinnitus, vertigo; No sinus or throat pain  NECK: No pain or stiffness  CARDIOVASCULAR: See HPI  RESPIRATORY: No Dyspnea on exertion, Shortness of breath, cough, wheezing  : No dysuria, no hematuria   GI: No dark color stool, no melena, no diarrhea, no constipation, no abdominal pain   NEURO: No headache, no dizziness, no slurred speech   MUSCULOSKELETAL: No joint pain or swelling; No muscle, back, or extremity pain  PSYCH: No agitation, no anxiety.    ALL OTHER REVIEW OF SYSTEMS ARE NEGATIVE.      PREVIOUS DIAGNOSTIC TESTING  ECHO FINDINGS:< from: TTE Echo Complete w/Doppler (01.07.20 @ 10:20) >  Summary:   1. Endocardial visualization was enhanced with intravenous echo contrast.   2. The left atrium is normal in size.   3. There is mild concentric left ventricular hypertrophy.   4. Left ventricular ejection fraction, by visual estimation, is 55 to 60%. Grade II diastolic dysfunction.   5. Elevated mean left atrial pressure.   6. The right atrium is normal in size.   7. Normal right ventricular size and function.   8. Severe aortic valve stenosis. Paradoxical low flow low gradient severe aortic stenosis (based on body surface area, calfcification ) consider BRET if clinical symptoms are discordant.   9. There is no evidence of pericardial effusion.    < end of copied text >      ALLERGIES: Allergies    No Known Allergies    Intolerances          PAST MEDICAL HISTORY  HLD (hyperlipidemia)  DM (diabetes mellitus)      PAST SURGICAL HISTORY  Charcot foot due to diabetes mellitus      FAMILY HISTORY:  DM-parents, brother, sister  MI- father age 62, brother age 72    SOCIAL HISTORY:    CIGARETTES:   past smoker quit 1976, smoked 1ppdx 20 years  ALCOHOL:Rarely  DRUGS:Denies      CURRENT MEDICATIONS:  midodrine 5 milliGRAM(s) Oral every 8 hours  venlafaxine XR.  atorvastatin  dextrose 5%.  dextrose 50% Injectable  dextrose 50% Injectable  dextrose 50% Injectable  enoxaparin Injectable  insulin glargine Injectable (LANTUS)  insulin lispro (HumaLOG) corrective regimen sliding scale  piperacillin/tazobactam IVPB..  saccharomyces boulardii        HOME MEDICATIONS:    enalapril 10 mg oral tablet: 1 tab(s) orally once a day (06 Jan 2020 20:11)  Farxiga 10 mg oral tablet: 1 tab(s) orally once a day (06 Jan 2020 20:11)  Lasix 20 mg oral tablet: 1 tab(s) orally once a day (06 Jan 2020 20:11)  metFORMIN 1000 mg oral tablet: 1 tab(s) orally 2 times a day (06 Jan 2020 20:11)  metoprolol succinate 25 mg oral tablet, extended release: 1 tab(s) orally once a day (06 Jan 2020 20:11)  NovoLOG 100 units/mL subcutaneous solution: 15 unit(s) subcutaneous 3 times a day (before meals) (06 Jan 2020 20:11)  pravastatin 80 mg oral tablet: 1 tab(s) orally once a day (06 Jan 2020 20:11)  Toujeo SoloStar 300 units/mL subcutaneous solution: 50  subcutaneous once a day (at bedtime) (06 Jan 2020 20:11)  venlafaxine 75 mg oral capsule, extended release: 1 cap(s) orally once a day (06 Jan 2020 20:11)  Victoza 18 mg/3 mL subcutaneous solution: subcutaneous once a day (06 Jan 2020 20:11)      Vital Signs Last 24 Hrs  T(C): 36.3 (10 Carlos 2020 08:12), Max: 37.3 (09 Jan 2020 23:00)  T(F): 97.4 (10 Carlos 2020 08:12), Max: 99.2 (09 Jan 2020 23:00)  HR: 88 (10 Carlos 2020 08:46) (59 - 130)  BP: 126/65 (10 Carlos 2020 08:12) (99/63 - 141/83)  RR: 18 (10 Carlos 2020 08:12) (18 - 19)  SpO2: 93% (10 Carlos 2020 08:12) (90% - 93%)      PHYSICAL EXAM:  Constitutional: Comfortable . No acute distress.   HEENT: Atraumatic and normocephalic , neck is supple . no JVD. No carotid bruit. PEERL   CNS: A&Ox3. No focal deficits. EOMI. Cranial nerves II-IX are intact.   Lymph Nodes: Cervical : Not palpable.  Respiratory: CTAB  Cardiovascular: S1S2 RRR. +systolic murmur, grade IV  Gastrointestinal: Soft non-tender and non distended . +Bowel sounds. negative Cash's sign.  Extremities: bilateral lower extremity edema +2/+2.   Psychiatric: Calm . no agitation.  Skin:Left foot covered in dressing, right foot edematous, erythema, bilateral legs edematous and erythema     Intake and output:     LABS:                        11.5   8.24  )-----------( 291      ( 10 Carlos 2020 06:36 )             36.3     01-10    135  |  102  |  13.0  ----------------------------<  196<H>  4.2   |  22.0  |  0.71    Ca    8.6      10 Carlos 2020 06:36        ;p-BNP=        INTERPRETATION OF TELEMETRY: Reviewed by me.   ECG: Reviewed by me.     RADIOLOGY & ADDITIONAL STUDIES:    X-ray: < from: Xray Chest 1 View-PORTABLE IMMEDIATE (01.06.20 @ 15:07) >  IMPRESSION:     Limited evaluation of left lung base, otherwise, noevidence of any acute lung disease.    < end of copied text >    CT scan: < from: CT Foot No Cont, Left (01.09.20 @ 08:57) >    IMPRESSION:    Severe neuropathic osteoarthropathy of the midfoot with areas of osseous bridging and acquired rocker-bottom deformity. Large plantar cutaneous wound at the level of the midfoot. There are nonspecific areas of osseous erosion along the plantar aspect of the midfoot subjacent to the wound, along the plantar posterior aspect of the calcaneus, and about the second metatarsal phalangeal joint. Findings may be related to underlying osteomyelitis versus degenerative change. Further evaluation with technetium sulfur coli marrow scan and leukocyte labeled white blood cell study is recommended to differentiate.    No subcutaneous air. Cellulitis about the visualized foot and ankle, most notably about the plantar wound.    < end of copied text >    MRI: < from: MR Foot No Cont, Left (01.09.20 @ 10:49) >  IMPRESSION:    1.  Skin defect and soft tissue edema at the midfoot and hindfoot concerning for cellulitis. Possible skin wound at the distal medial forefoot. No drainable fluid collection.  2.  Abnormal marrow signal in the plantar calcaneus and within the midfoot concerning for osteomyelitis given the overlying skin wound.  3.  Osteomyelitis of the second metatarsal head and second proximal phalanx. Suspected overlying skin wound.  4.  Chronic severe Charcot arthropathy  5.  Suspected full-thickness tearing of the flexor tendons to the third through fifth digits at the level of the midfoot skin ulceration.        < end of copied text > [facility]                                                               Office:  [address] [place] [address]                                                              Telephone: [phone]. Fax:[phone]                                                                        CARDIOLOGY CONSULTATION NOTE                                                                                             Consult requested by:    Reason for Consultation:   History obtained by: Patient and medical record   obtained: No    Chief complaint:    Patient is a 74y old  Male who presents with a chief complaint of Septic shock (09 Jan 2020 10:45)    HPI: Pt is a 75 y/o male with medical history of HTN, DM, and AS who presents to [facility]ED for chills and lethargy x 1 day. Pt states he is currently visiting from [place] but all of Monday 1/6/19, he started to experience chills. Pt came to [facility]ED where he was +sepsis with hypotension, febrile, tachycardia and elevated WBC. Pt was treated in MICU, then was downgraded to medical floor. Consult was called for new onset atrial tachycardia. Pt asymptomatic of atrial tachycardia episodes and denies current fever, chills, cough, phlegm production, shortness of breath, dyspnea on exertion, orthopnea, PND, edema, chest pain, pressure, palpitations, irregular, fast heart beat, nausea, vomiting, melena, rectal bleed, hematuria, lightheadedness, dizziness, syncope, near syncope.     REVIEW OF SYMPTOMS:     CONSTITUTIONAL: No fever, weight loss, or fatigue  ENMT:  No difficulty hearing, tinnitus, vertigo; No sinus or throat pain  NECK: No pain or stiffness  CARDIOVASCULAR: See HPI  RESPIRATORY: No Dyspnea on exertion, Shortness of breath, cough, wheezing  : No dysuria, no hematuria   GI: No dark color stool, no melena, no diarrhea, no constipation, no abdominal pain   NEURO: No headache, no dizziness, no slurred speech   MUSCULOSKELETAL: No joint pain or swelling; No muscle, back, or extremity pain  PSYCH: No agitation, no anxiety.    ALL OTHER REVIEW OF SYSTEMS ARE NEGATIVE.      PREVIOUS DIAGNOSTIC TESTING  ECHO FINDINGS:< from: TTE Echo Complete w/Doppler (01.07.20 @ 10:20) >  Summary:   1. Endocardial visualization was enhanced with intravenous echo contrast.   2. The left atrium is normal in size.   3. There is mild concentric left ventricular hypertrophy.   4. Left ventricular ejection fraction, by visual estimation, is 55 to 60%. Grade II diastolic dysfunction.   5. Elevated mean left atrial pressure.   6. The right atrium is normal in size.   7. Normal right ventricular size and function.   8. Severe aortic valve stenosis. Paradoxical low flow low gradient severe aortic stenosis (based on body surface area, calfcification ) consider BRET if clinical symptoms are discordant.   9. There is no evidence of pericardial effusion.    < end of copied text >      ALLERGIES: Allergies    No Known Allergies    Intolerances    PAST MEDICAL HISTORY  HLD (hyperlipidemia)  DM (diabetes mellitus)    PAST SURGICAL HISTORY  Charcot foot due to diabetes mellitus    FAMILY HISTORY:  DM-parents, brother, sister  MI- father age 62, brother age 72    SOCIAL HISTORY:    CIGARETTES:   past smoker quit 1976, smoked 1ppdx 20 years  ALCOHOL:Rarely  DRUGS:Denies    CURRENT MEDICATIONS:  midodrine 5 milliGRAM(s) Oral every 8 hours  venlafaxine XR.  atorvastatin  dextrose 5%.  dextrose 50% Injectable  dextrose 50% Injectable  dextrose 50% Injectable  enoxaparin Injectable  insulin glargine Injectable (LANTUS)  insulin lispro (HumaLOG) corrective regimen sliding scale  piperacillin/tazobactam IVPB..  saccharomyces boulardii        HOME MEDICATIONS:    enalapril 10 mg oral tablet: 1 tab(s) orally once a day (06 Jan 2020 20:11)  Farxiga 10 mg oral tablet: 1 tab(s) orally once a day (06 Jan 2020 20:11)  Lasix 20 mg oral tablet: 1 tab(s) orally once a day (06 Jan 2020 20:11)  metFORMIN 1000 mg oral tablet: 1 tab(s) orally 2 times a day (06 Jan 2020 20:11)  metoprolol succinate 25 mg oral tablet, extended release: 1 tab(s) orally once a day (06 Jan 2020 20:11)  NovoLOG 100 units/mL subcutaneous solution: 15 unit(s) subcutaneous 3 times a day (before meals) (06 Jan 2020 20:11)  pravastatin 80 mg oral tablet: 1 tab(s) orally once a day (06 Jan 2020 20:11)  Toujeo SoloStar 300 units/mL subcutaneous solution: 50  subcutaneous once a day (at bedtime) (06 Jan 2020 20:11)  venlafaxine 75 mg oral capsule, extended release: 1 cap(s) orally once a day (06 Jan 2020 20:11)  Victoza 18 mg/3 mL subcutaneous solution: subcutaneous once a day (06 Jan 2020 20:11)      Vital Signs Last 24 Hrs  T(C): 36.3 (10 Carlos 2020 08:12), Max: 37.3 (09 Jan 2020 23:00)  T(F): 97.4 (10 Carlos 2020 08:12), Max: 99.2 (09 Jan 2020 23:00)  HR: 88 (10 Carlos 2020 08:46) (59 - 130)  BP: 126/65 (10 Carlos 2020 08:12) (99/63 - 141/83)  RR: 18 (10 Carlos 2020 08:12) (18 - 19)  SpO2: 93% (10 Carlos 2020 08:12) (90% - 93%)      PHYSICAL EXAM:  Constitutional: Comfortable . No acute distress.   HEENT: Atraumatic and normocephalic , neck is supple . no JVD. No carotid bruit. PEERL   CNS: A&Ox3. No focal deficits. EOMI. Cranial nerves II-IX are intact.   Lymph Nodes: Cervical : Not palpable.  Respiratory: CTAB  Cardiovascular: S1S2 RRR. +systolic murmur, grade IV  Gastrointestinal: Soft non-tender and non distended . +Bowel sounds. negative Cash's sign.  Extremities: bilateral lower extremity edema +2/+2.   Psychiatric: Calm . no agitation.  Skin:Left foot covered in dressing, right foot edematous, erythema, bilateral legs edematous and erythema     Intake and output:     LABS:                        11.5   8.24  )-----------( 291      ( 10 Carlos 2020 06:36 )             36.3     01-10    135  |  102  |  13.0  ----------------------------<  196<H>  4.2   |  22.0  |  0.71    Ca    8.6      10 Carlos 2020 06:36        ;p-BNP=        INTERPRETATION OF TELEMETRY: Reviewed by me.   ECG: Reviewed by me.     RADIOLOGY & ADDITIONAL STUDIES:    X-ray: < from: Xray Chest 1 View-PORTABLE IMMEDIATE (01.06.20 @ 15:07) >  IMPRESSION:     Limited evaluation of left lung base, otherwise, noevidence of any acute lung disease.    < end of copied text >    CT scan: < from: CT Foot No Cont, Left (01.09.20 @ 08:57) >    IMPRESSION:    Severe neuropathic osteoarthropathy of the midfoot with areas of osseous bridging and acquired rocker-bottom deformity. Large plantar cutaneous wound at the level of the midfoot. There are nonspecific areas of osseous erosion along the plantar aspect of the midfoot subjacent to the wound, along the plantar posterior aspect of the calcaneus, and about the second metatarsal phalangeal joint. Findings may be related to underlying osteomyelitis versus degenerative change. Further evaluation with technetium sulfur coli marrow scan and leukocyte labeled white blood cell study is recommended to differentiate.    No subcutaneous air. Cellulitis about the visualized foot and ankle, most notably about the plantar wound.    < end of copied text >    MRI: < from: MR Foot No Cont, Left (01.09.20 @ 10:49) >  IMPRESSION:    1.  Skin defect and soft tissue edema at the midfoot and hindfoot concerning for cellulitis. Possible skin wound at the distal medial forefoot. No drainable fluid collection.  2.  Abnormal marrow signal in the plantar calcaneus and within the midfoot concerning for osteomyelitis given the overlying skin wound.  3.  Osteomyelitis of the second metatarsal head and second proximal phalanx. Suspected overlying skin wound.  4.  Chronic severe Charcot arthropathy  5.  Suspected full-thickness tearing of the flexor tendons to the third through fifth digits at the level of the midfoot skin ulceration.        < end of copied text >

## 2020-01-11 VITALS — OXYGEN SATURATION: 95 % | HEART RATE: 57 BPM | RESPIRATION RATE: 20 BRPM | TEMPERATURE: 98 F

## 2020-01-11 LAB
GLUCOSE BLDC GLUCOMTR-MCNC: 209 MG/DL — HIGH (ref 70–99)
GLUCOSE BLDC GLUCOMTR-MCNC: 253 MG/DL — HIGH (ref 70–99)

## 2020-01-11 PROCEDURE — 73700 CT LOWER EXTREMITY W/O DYE: CPT

## 2020-01-11 PROCEDURE — 85610 PROTHROMBIN TIME: CPT

## 2020-01-11 PROCEDURE — 83036 HEMOGLOBIN GLYCOSYLATED A1C: CPT

## 2020-01-11 PROCEDURE — 87150 DNA/RNA AMPLIFIED PROBE: CPT

## 2020-01-11 PROCEDURE — 93306 TTE W/DOPPLER COMPLETE: CPT

## 2020-01-11 PROCEDURE — 87070 CULTURE OTHR SPECIMN AEROBIC: CPT

## 2020-01-11 PROCEDURE — 87633 RESP VIRUS 12-25 TARGETS: CPT

## 2020-01-11 PROCEDURE — 93923 UPR/LXTR ART STDY 3+ LVLS: CPT | Mod: 26

## 2020-01-11 PROCEDURE — 96375 TX/PRO/DX INJ NEW DRUG ADDON: CPT

## 2020-01-11 PROCEDURE — 87040 BLOOD CULTURE FOR BACTERIA: CPT

## 2020-01-11 PROCEDURE — 80061 LIPID PANEL: CPT

## 2020-01-11 PROCEDURE — 84484 ASSAY OF TROPONIN QUANT: CPT

## 2020-01-11 PROCEDURE — 93005 ELECTROCARDIOGRAM TRACING: CPT

## 2020-01-11 PROCEDURE — 71045 X-RAY EXAM CHEST 1 VIEW: CPT

## 2020-01-11 PROCEDURE — 85027 COMPLETE CBC AUTOMATED: CPT

## 2020-01-11 PROCEDURE — 85652 RBC SED RATE AUTOMATED: CPT

## 2020-01-11 PROCEDURE — 86140 C-REACTIVE PROTEIN: CPT

## 2020-01-11 PROCEDURE — 73718 MRI LOWER EXTREMITY W/O DYE: CPT

## 2020-01-11 PROCEDURE — 73630 X-RAY EXAM OF FOOT: CPT

## 2020-01-11 PROCEDURE — 87086 URINE CULTURE/COLONY COUNT: CPT

## 2020-01-11 PROCEDURE — 84100 ASSAY OF PHOSPHORUS: CPT

## 2020-01-11 PROCEDURE — 83735 ASSAY OF MAGNESIUM: CPT

## 2020-01-11 PROCEDURE — 85730 THROMBOPLASTIN TIME PARTIAL: CPT

## 2020-01-11 PROCEDURE — 99239 HOSP IP/OBS DSCHRG MGMT >30: CPT

## 2020-01-11 PROCEDURE — 99285 EMERGENCY DEPT VISIT HI MDM: CPT | Mod: 25

## 2020-01-11 PROCEDURE — 94640 AIRWAY INHALATION TREATMENT: CPT

## 2020-01-11 PROCEDURE — 80053 COMPREHEN METABOLIC PANEL: CPT

## 2020-01-11 PROCEDURE — 87486 CHLMYD PNEUM DNA AMP PROBE: CPT

## 2020-01-11 PROCEDURE — 83605 ASSAY OF LACTIC ACID: CPT

## 2020-01-11 PROCEDURE — 80048 BASIC METABOLIC PNL TOTAL CA: CPT

## 2020-01-11 PROCEDURE — 36415 COLL VENOUS BLD VENIPUNCTURE: CPT

## 2020-01-11 PROCEDURE — 87581 M.PNEUMON DNA AMP PROBE: CPT

## 2020-01-11 PROCEDURE — 87186 SC STD MICRODIL/AGAR DIL: CPT

## 2020-01-11 PROCEDURE — 81001 URINALYSIS AUTO W/SCOPE: CPT

## 2020-01-11 PROCEDURE — 93923 UPR/LXTR ART STDY 3+ LVLS: CPT

## 2020-01-11 PROCEDURE — 82962 GLUCOSE BLOOD TEST: CPT

## 2020-01-11 PROCEDURE — 87798 DETECT AGENT NOS DNA AMP: CPT

## 2020-01-11 PROCEDURE — 96374 THER/PROPH/DIAG INJ IV PUSH: CPT

## 2020-01-11 RX ORDER — SACCHAROMYCES BOULARDII 250 MG
1 POWDER IN PACKET (EA) ORAL
Qty: 20 | Refills: 0
Start: 2020-01-11 | End: 2020-01-20

## 2020-01-11 RX ORDER — CIPROFLOXACIN LACTATE 400MG/40ML
1 VIAL (ML) INTRAVENOUS
Qty: 10 | Refills: 0
Start: 2020-01-11 | End: 2020-01-20

## 2020-01-11 RX ADMIN — Medication 250 MILLIGRAM(S): at 05:51

## 2020-01-11 RX ADMIN — Medication 25 MILLIGRAM(S): at 05:51

## 2020-01-11 RX ADMIN — Medication 75 MILLIGRAM(S): at 11:57

## 2020-01-11 RX ADMIN — ENOXAPARIN SODIUM 40 MILLIGRAM(S): 100 INJECTION SUBCUTANEOUS at 08:04

## 2020-01-11 RX ADMIN — Medication 4: at 08:02

## 2020-01-11 RX ADMIN — Medication 6: at 11:57

## 2020-01-11 NOTE — DISCHARGE NOTE PROVIDER - NSDCCPCAREPLAN_GEN_ALL_CORE_FT
PRINCIPAL DISCHARGE DIAGNOSIS  Diagnosis: Cellulitis  Assessment and Plan of Treatment:       SECONDARY DISCHARGE DIAGNOSES  Diagnosis: Diabetic Charcot's foot  Assessment and Plan of Treatment:     Diagnosis: Diabetic foot infection  Assessment and Plan of Treatment:     Diagnosis: Sepsis  Assessment and Plan of Treatment:

## 2020-01-11 NOTE — DISCHARGE NOTE PROVIDER - HOSPITAL COURSE
73 y/o Male who presented with c/o Lethargy feeling generalized weakness fever and chills that started day of admission and patient was unaware of draining left foot ulcer with erythema. In the ED patient had fever, elevated WBC, tachycardia, hypotension requiring IVF resuscitation. MICU called for evaluation. Despite fluid resuscitation patient required levophed drip and was admitted to the ICU for septic shock from diabetic foot infection.         On 1/6 blood cultures showed GNR. Repeat culture from the 7th pending along with wound culture. Septic shock resolved and patient downgraded on the 1/7. Patient continued on zosyn. Podiatry and infectious disease consulted.        CT performed on 1/9 showed no collection/air, possible osteo, MRI performed 1/9  with abnormal marrow signal in the plantar calcaneus and within the midfoot concerning for osteomyelitis, and osteomyelitis of the second metatarsal head and second proximal phalanx.        Per ID if patient without resistant organisms he will be discharged with outpatient follow up with his care providers in [place] where he is from. He will go on levaquin x 14 days PO to bridge him to those appointments.        #Severe sepsis/septic shock: resolved    - 2/2 diabetic foot ulcer    - continue zosyn IV, anticipate change to levaquin on d/c    - Last BCx2  1/7/20 no growth        #hypotension - improving    - 126/65 (10 Carlos 2020 08:12) (99/63 - 141/83)    - remains on midodrine Q8  - parameters added to hold for systolic >110    - resume anti-HTN when feasible (home meds include enalapril, lasix and metoprolol)        # tachycardic    -  home meds include enalapril, lasix and metoprolol- all on hold due to hypotension    - Cardiology consult requested to eval tachycardia for recommendations and clearance for d/c for pt to travel back to [place] to follow up with his own drs    - Is followed by cardiology and reports is supposed to have surgery for AS but this has been deferred because of his foot ulcer.         #Diabetic foot ulcer, suspected OM    - c/w IV ABx as per ID. currently on Zosyn    - wound culture shows Proteus mirabilis, Citrobacter braakii, Enterococcus faecalis, Corynebacterium species    - MRI right foot c/w osteomyelitis    - Echo: no vegetation    - wound care consult    - patient would like to go home with oral antibiotics as he is not from this area and prefers to follow up with his own surgeons and physicians        #acute resp failure with hypoxia: resolved    - leg doppler negative for clot    - incentive spirometer        #DM:    - ISS, Accucheck        #h/o AS    - echo today showed severe AS. Normal EF    - patient is asymptomatic    - follows cardiology in [place]        DVT-P: lovenox    Diet: consistent carb        Dispo: Anticipate d/c home today with PO antibiotics and follow up with providers in [facility] pending clearance by cardiology        D/W pt, wife, Dr Moore 75 y/o Male who presented with c/o Lethargy feeling generalized weakness fever and chills that started day of admission and patient was unaware of draining left foot ulcer with erythema. In the ED patient had fever, elevated WBC, tachycardia, hypotension requiring IVF resuscitation. MICU called for evaluation. Despite fluid resuscitation patient required levophed drip and was admitted to the ICU for septic shock from diabetic foot infection.         On 1/6 blood cultures showed GNR. Repeat culture from the 7th pending . Septic shock resolved and patient downgraded on the 1/7. Patient continued on zosyn. Podiatry and infectious disease consulted        CT performed on 1/9 showed no collection/air, possible osteo, MRI performed 1/9  with abnormal marrow signal in the plantar calcaneus and within the midfoot concerning for osteomyelitis, and osteomyelitis of the second metatarsal head and second proximal phalanx.        Per ID if patient without resistant organisms he will be discharged with outpatient follow up with his care providers in [place] where he is from. He will go on levaquin 750mg po daily for 10 days to bridge him to those appointments.        #Severe sepsis/septic shock: resolved    - 2/2 diabetic foot ulcer    -  Dc home on levaquin 750mg po daily for 10 days     -  BCx2  1/7/20 no growth        #hypotension - Resolved     DC midodrine Q8  -     - resume anti-HTN            #Diabetic foot ulcer, suspected OM    - Started with zosyn . Transitioned to po levaquin for 10 days     - wound culture shows Proteus mirabilis, Citrobacter braakii, Enterococcus faecalis, Corynebacterium species    - MRI right foot c/w osteomyelitis    - Echo: no vegetation        - patient would like to go home with oral antibiotics as he is not from this area and prefers to follow up with his own surgeons and physicians        #acute resp failure with hypoxia: resolved    - leg doppler negative for clot    - incentive spirometer        #DM:    - Home medication         #h/o AS    - echo today showed severe AS. Normal EF    - patient is asymptomatic    - follows cardiology in [place]        Dispo: Anticipate d/c home today with PO antibiotics and follow up with providers in [facility]

## 2020-01-11 NOTE — DISCHARGE NOTE NURSING/CASE MANAGEMENT/SOCIAL WORK - PATIENT PORTAL LINK FT
You can access the FollowMyHealth Patient Portal offered by [facility] by registering at the following website: [email or web address]. By joining [employer]’s FollowMyHealth portal, you will also be able to view your health information using other applications (apps) compatible with our system.

## 2020-01-11 NOTE — DISCHARGE NOTE PROVIDER - NSDCMRMEDTOKEN_GEN_ALL_CORE_FT
enalapril 10 mg oral tablet: 1 tab(s) orally once a day  Farxiga 10 mg oral tablet: 1 tab(s) orally once a day  Lasix 20 mg oral tablet: 1 tab(s) orally once a day  levoFLOXacin 750 mg oral tablet: 1 tab(s) orally every 24 hours  metFORMIN 1000 mg oral tablet: 1 tab(s) orally 2 times a day  metoprolol succinate 25 mg oral tablet, extended release: 1 tab(s) orally once a day  NovoLOG 100 units/mL subcutaneous solution: 15 unit(s) subcutaneous 3 times a day (before meals)  pravastatin 80 mg oral tablet: 1 tab(s) orally once a day  saccharomyces boulardii lyo 250 mg oral capsule: 1 cap(s) orally 2 times a day  Toujeo SoloStar 300 units/mL subcutaneous solution: 50  subcutaneous once a day (at bedtime)  venlafaxine 75 mg oral capsule, extended release: 1 cap(s) orally once a day  Victoza 18 mg/3 mL subcutaneous solution: subcutaneous once a day

## 2020-01-12 LAB
CULTURE RESULTS: SIGNIFICANT CHANGE UP
ORGANISM # SPEC MICROSCOPIC CNT: SIGNIFICANT CHANGE UP
SPECIMEN SOURCE: SIGNIFICANT CHANGE UP

## 2020-01-14 LAB
CULTURE RESULTS: SIGNIFICANT CHANGE UP
CULTURE RESULTS: SIGNIFICANT CHANGE UP
METHOD TYPE: SIGNIFICANT CHANGE UP
ORGANISM # SPEC MICROSCOPIC CNT: SIGNIFICANT CHANGE UP
SPECIMEN SOURCE: SIGNIFICANT CHANGE UP
SPECIMEN SOURCE: SIGNIFICANT CHANGE UP

## 2020-01-15 LAB
CULTURE RESULTS: SIGNIFICANT CHANGE UP
SPECIMEN SOURCE: SIGNIFICANT CHANGE UP
